# Patient Record
Sex: MALE | Race: BLACK OR AFRICAN AMERICAN | Employment: FULL TIME | URBAN - METROPOLITAN AREA
[De-identification: names, ages, dates, MRNs, and addresses within clinical notes are randomized per-mention and may not be internally consistent; named-entity substitution may affect disease eponyms.]

---

## 2017-04-21 NOTE — PROGRESS NOTES
HPI:    Patient ID: Curt Saldivar is a 46year old male. Pt presents to the clinic for hx of intermittent anxiety and depression. He started a new job last year at a [employer] that requires FMLA forms to be filled out.  His previous job of 14 years was Years:         Alcohol Use: Yes                     No current outpatient prescriptions on file. Allergies:No Known Allergies   PHYSICAL EXAM:   Physical Exam   Constitutional: He appears well-developed and well-nourished. No distress.    HENT:   Head: Nor cancer   FHx of colon CA; pt's grandmother  of colon CA. - Referred to [facility] for colonoscopy. No orders of the defined types were placed in this encounter.        Meds This Visit:  No prescriptions requested or ordered in this encounter

## 2017-04-28 ENCOUNTER — TELEPHONE (OUTPATIENT)
Dept: ADMINISTRATIVE | Age: 42
End: 2017-04-28

## 2017-04-28 NOTE — TELEPHONE ENCOUNTER
Dr. Aline Inman,  Pt requesting intermittent FMLA for anxiety. Limited HX.   Do you support this and if so how much time per month do you suggest?  Please advise, thanks Babs

## 2017-07-31 ENCOUNTER — TELEPHONE (OUTPATIENT)
Dept: INTERNAL MEDICINE CLINIC | Facility: CLINIC | Age: 42
End: 2017-07-31

## 2017-07-31 NOTE — TELEPHONE ENCOUNTER
Actions Requested: appt made   Problem: neck pain  Onset and Timing: over one month  Associated Symptoms: neck pain 7/10, intermittent, sharp, lightheaded  Aggravating by: bending forward, lifting  Alleviated by: nothing  Triage Note: advised patient to ma together (i.e., dermatomal distribution or \"band\" or \"stripe\")  * High-risk adult (e.g., history of cancer, HIV, or IV drug abuse)  * Patient wants to be seen  * Tenderness in front of neck over windpipe  * Moderate neck pain (e.g., interferes with nor

## 2017-08-01 ENCOUNTER — LAB ENCOUNTER (OUTPATIENT)
Dept: LAB | Age: 42
End: 2017-08-01
Attending: INTERNAL MEDICINE
Payer: COMMERCIAL

## 2017-08-01 ENCOUNTER — HOSPITAL ENCOUNTER (OUTPATIENT)
Dept: GENERAL RADIOLOGY | Age: 42
Discharge: HOME | End: 2017-08-01
Attending: INTERNAL MEDICINE
Payer: COMMERCIAL

## 2017-08-01 ENCOUNTER — OFFICE VISIT (OUTPATIENT)
Dept: INTERNAL MEDICINE CLINIC | Facility: CLINIC | Age: 42
End: 2017-08-01

## 2017-08-01 VITALS
BODY MASS INDEX: 21.14 KG/M2 | SYSTOLIC BLOOD PRESSURE: 110 MMHG | DIASTOLIC BLOOD PRESSURE: 74 MMHG | WEIGHT: 151 LBS | HEIGHT: 71 IN | HEART RATE: 60 BPM | TEMPERATURE: 98 F

## 2017-08-01 DIAGNOSIS — M54.2 CHRONIC NECK PAIN: ICD-10-CM

## 2017-08-01 DIAGNOSIS — R07.9 CHEST PAIN, UNSPECIFIED TYPE: ICD-10-CM

## 2017-08-01 DIAGNOSIS — G89.29 CHRONIC NECK PAIN: ICD-10-CM

## 2017-08-01 DIAGNOSIS — R55 SYNCOPE, UNSPECIFIED SYNCOPE TYPE: Primary | ICD-10-CM

## 2017-08-01 DIAGNOSIS — R55 SYNCOPE, UNSPECIFIED SYNCOPE TYPE: ICD-10-CM

## 2017-08-01 LAB
ALBUMIN SERPL BCP-MCNC: 3.9 G/DL (ref 3.5–4.8)
ALBUMIN/GLOB SERPL: 1.2 {RATIO} (ref 1–2)
ALP SERPL-CCNC: 39 U/L (ref 32–100)
ALT SERPL-CCNC: 13 U/L (ref 17–63)
ANION GAP SERPL CALC-SCNC: 6 MMOL/L (ref 0–18)
AST SERPL-CCNC: 19 U/L (ref 15–41)
BASOPHILS # BLD: 0 K/UL (ref 0–0.2)
BASOPHILS NFR BLD: 1 %
BILIRUB SERPL-MCNC: 0.6 MG/DL (ref 0.3–1.2)
BUN SERPL-MCNC: 12 MG/DL (ref 8–20)
BUN/CREAT SERPL: 8.9 (ref 10–20)
CALCIUM SERPL-MCNC: 8.9 MG/DL (ref 8.5–10.5)
CHLORIDE SERPL-SCNC: 104 MMOL/L (ref 95–110)
CO2 SERPL-SCNC: 28 MMOL/L (ref 22–32)
CREAT SERPL-MCNC: 1.35 MG/DL (ref 0.5–1.5)
EOSINOPHIL # BLD: 0.2 K/UL (ref 0–0.7)
EOSINOPHIL NFR BLD: 4 %
ERYTHROCYTE [DISTWIDTH] IN BLOOD BY AUTOMATED COUNT: 14 % (ref 11–15)
GLOBULIN PLAS-MCNC: 3.2 G/DL (ref 2.5–3.7)
GLUCOSE SERPL-MCNC: 100 MG/DL (ref 70–99)
HCT VFR BLD AUTO: 44.7 % (ref 41–52)
HGB BLD-MCNC: 14.8 G/DL (ref 13.5–17.5)
LYMPHOCYTES # BLD: 2.3 K/UL (ref 1–4)
LYMPHOCYTES NFR BLD: 42 %
MCH RBC QN AUTO: 30.6 PG (ref 27–32)
MCHC RBC AUTO-ENTMCNC: 33.1 G/DL (ref 32–37)
MCV RBC AUTO: 92.5 FL (ref 80–100)
MONOCYTES # BLD: 0.5 K/UL (ref 0–1)
MONOCYTES NFR BLD: 9 %
NEUTROPHILS # BLD AUTO: 2.5 K/UL (ref 1.8–7.7)
NEUTROPHILS NFR BLD: 45 %
OSMOLALITY UR CALC.SUM OF ELEC: 286 MOSM/KG (ref 275–295)
PLATELET # BLD AUTO: 154 K/UL (ref 140–400)
PMV BLD AUTO: 8.7 FL (ref 7.4–10.3)
POTASSIUM SERPL-SCNC: 4.1 MMOL/L (ref 3.3–5.1)
PROT SERPL-MCNC: 7.1 G/DL (ref 5.9–8.4)
RBC # BLD AUTO: 4.83 M/UL (ref 4.5–5.9)
SODIUM SERPL-SCNC: 138 MMOL/L (ref 136–144)
TSH SERPL-ACNC: 1.17 UIU/ML (ref 0.45–5.33)
WBC # BLD AUTO: 5.5 K/UL (ref 4–11)

## 2017-08-01 PROCEDURE — 99214 OFFICE O/P EST MOD 30 MIN: CPT | Performed by: INTERNAL MEDICINE

## 2017-08-01 PROCEDURE — 93000 ELECTROCARDIOGRAM COMPLETE: CPT | Performed by: INTERNAL MEDICINE

## 2017-08-01 PROCEDURE — 93005 ELECTROCARDIOGRAM TRACING: CPT | Performed by: INTERNAL MEDICINE

## 2017-08-01 PROCEDURE — 72040 X-RAY EXAM NECK SPINE 2-3 VW: CPT | Performed by: INTERNAL MEDICINE

## 2017-08-01 PROCEDURE — 80053 COMPREHEN METABOLIC PANEL: CPT

## 2017-08-01 PROCEDURE — 36415 COLL VENOUS BLD VENIPUNCTURE: CPT

## 2017-08-01 PROCEDURE — 84443 ASSAY THYROID STIM HORMONE: CPT

## 2017-08-01 PROCEDURE — 99212 OFFICE O/P EST SF 10 MIN: CPT | Performed by: INTERNAL MEDICINE

## 2017-08-01 PROCEDURE — 71020 XR CHEST PA + LAT CHEST (CPT=71020): CPT | Performed by: INTERNAL MEDICINE

## 2017-08-01 PROCEDURE — 85025 COMPLETE CBC W/AUTO DIFF WBC: CPT

## 2017-08-01 NOTE — PROGRESS NOTES
HPI:    Patient ID: Corby Desai is a 38year old male. Neck Pain    This is a new problem. The current episode started more than 1 month ago (2mos). The problem occurs intermittently. The problem has been waxing and waning.  The pain is associated wit Improvement on treatment: resolves spontaneously. Risk factors include male gender. Pertinent negatives for past medical history include no CAD, no diabetes and no hypertension.    Pertinent negatives for family medical history include: no CAD and no hear Neurological: He is alert and oriented to person, place, and time. He has normal strength. No sensory deficit. Reflex Scores:       Bicep reflexes are 2+ on the right side and 2+ on the left side.        Brachioradialis reflexes are 2+ on the right side [id number]

## 2017-08-03 ENCOUNTER — HOSPITAL ENCOUNTER (OUTPATIENT)
Dept: CV DIAGNOSTICS | Facility: HOSPITAL | Age: 42
Discharge: HOME | End: 2017-08-03
Attending: INTERNAL MEDICINE
Payer: COMMERCIAL

## 2017-08-03 DIAGNOSIS — R55 SYNCOPE, UNSPECIFIED SYNCOPE TYPE: ICD-10-CM

## 2017-08-03 PROCEDURE — 93227 XTRNL ECG REC<48 HR R&I: CPT | Performed by: INTERNAL MEDICINE

## 2017-08-05 ENCOUNTER — HOSPITAL ENCOUNTER (OUTPATIENT)
Dept: CV DIAGNOSTICS | Facility: HOSPITAL | Age: 42
Discharge: HOME | End: 2017-08-05
Attending: INTERNAL MEDICINE
Payer: COMMERCIAL

## 2017-08-05 PROCEDURE — 93225 XTRNL ECG REC<48 HRS REC: CPT | Performed by: INTERNAL MEDICINE

## 2017-08-07 ENCOUNTER — HOSPITAL ENCOUNTER (OUTPATIENT)
Dept: CV DIAGNOSTICS | Facility: HOSPITAL | Age: 42
Discharge: HOME | End: 2017-08-07
Attending: INTERNAL MEDICINE
Payer: COMMERCIAL

## 2017-08-07 DIAGNOSIS — R55 SYNCOPE, UNSPECIFIED SYNCOPE TYPE: ICD-10-CM

## 2017-08-07 DIAGNOSIS — R07.9 CHEST PAIN, UNSPECIFIED TYPE: ICD-10-CM

## 2017-08-07 PROCEDURE — 93306 TTE W/DOPPLER COMPLETE: CPT | Performed by: INTERNAL MEDICINE

## 2017-08-14 ENCOUNTER — HOSPITAL ENCOUNTER (OUTPATIENT)
Dept: CV DIAGNOSTICS | Facility: HOSPITAL | Age: 42
Discharge: HOME | End: 2017-08-14
Attending: INTERNAL MEDICINE
Payer: COMMERCIAL

## 2017-08-14 ENCOUNTER — TELEPHONE (OUTPATIENT)
Dept: INTERNAL MEDICINE CLINIC | Facility: CLINIC | Age: 42
End: 2017-08-14

## 2017-08-14 DIAGNOSIS — R07.9 CHEST PAIN, UNSPECIFIED TYPE: ICD-10-CM

## 2017-08-14 DIAGNOSIS — R55 SYNCOPE, UNSPECIFIED SYNCOPE TYPE: ICD-10-CM

## 2017-08-14 PROCEDURE — 93016 CV STRESS TEST SUPVJ ONLY: CPT | Performed by: INTERNAL MEDICINE

## 2017-08-14 PROCEDURE — 93018 CV STRESS TEST I&R ONLY: CPT | Performed by: INTERNAL MEDICINE

## 2017-08-14 PROCEDURE — 93017 CV STRESS TEST TRACING ONLY: CPT | Performed by: INTERNAL MEDICINE

## 2017-08-15 ENCOUNTER — OFFICE VISIT (OUTPATIENT)
Dept: INTERNAL MEDICINE CLINIC | Facility: CLINIC | Age: 42
End: 2017-08-15

## 2017-08-15 VITALS
BODY MASS INDEX: 21.76 KG/M2 | TEMPERATURE: 98 F | RESPIRATION RATE: 12 BRPM | WEIGHT: 152 LBS | DIASTOLIC BLOOD PRESSURE: 60 MMHG | SYSTOLIC BLOOD PRESSURE: 104 MMHG | HEART RATE: 68 BPM | HEIGHT: 70 IN

## 2017-08-15 DIAGNOSIS — M47.812 SPONDYLOSIS OF CERVICAL REGION WITHOUT MYELOPATHY OR RADICULOPATHY: ICD-10-CM

## 2017-08-15 DIAGNOSIS — R55 SYNCOPE, UNSPECIFIED SYNCOPE TYPE: ICD-10-CM

## 2017-08-15 DIAGNOSIS — R93.1 ABNORMAL ECHOCARDIOGRAM: ICD-10-CM

## 2017-08-15 DIAGNOSIS — M54.2 NECK PAIN: Primary | ICD-10-CM

## 2017-08-15 PROCEDURE — 99213 OFFICE O/P EST LOW 20 MIN: CPT | Performed by: INTERNAL MEDICINE

## 2017-08-15 PROCEDURE — 99212 OFFICE O/P EST SF 10 MIN: CPT | Performed by: INTERNAL MEDICINE

## 2017-08-16 NOTE — PROGRESS NOTES
HPI:    Patient ID: Steven Reyes is a 38year old male. Patient presents today for ffup and discussion of his tests results. We had seen  Him 2 weeks ago with complaints of neck pains and, chest pains/syncope. His labs showed normal cbc, tsh and cmp. Syncope, unspecified syncope type  Plan: pt had normal 48 hr holter and treadmill stress test. His 2d echo showed low normal LV systolic fxn and mild TR/MR.  I told pt I want him to see cardiologist for opinion regarding abnormal 2decho and also further kelsey

## 2017-08-17 NOTE — TELEPHONE ENCOUNTER
----- Message from Emilia Jerez MD sent at 8/2/2017  7:40 AM CDT -----  Notify pt; his cxr was fine; cervical spine xray showed mild degenerative arthritis.  We can refer pt for physical therapy for neck pain,  Labs cbc, cmp and  tsh were fine; proc

## 2017-08-28 ENCOUNTER — OFFICE VISIT (OUTPATIENT)
Dept: PHYSICAL THERAPY | Facility: HOSPITAL | Age: 42
End: 2017-08-28
Attending: PEDIATRICS
Payer: COMMERCIAL

## 2017-08-28 DIAGNOSIS — M54.2 NECK PAIN: ICD-10-CM

## 2017-08-28 DIAGNOSIS — M47.812 SPONDYLOSIS OF CERVICAL REGION WITHOUT MYELOPATHY OR RADICULOPATHY: ICD-10-CM

## 2017-08-28 PROCEDURE — 97162 PT EVAL MOD COMPLEX 30 MIN: CPT

## 2017-08-28 PROCEDURE — 97530 THERAPEUTIC ACTIVITIES: CPT

## 2017-08-28 NOTE — TELEPHONE ENCOUNTER
Pt called, message per Dr given. Verbalized understanding and compliance  Started PT today. saw Dr Nishi Berry last week in [address]

## 2017-08-28 NOTE — PROGRESS NOTES
CERVICAL SPINE EVALUATION:   Referring Physician: Flores Irizarry MD    Date of Onset: 7-8 months ago  Date of Service: 8/28/17   Diagnosis: Neck pain (M54.2)  Spondylosis of cervical region without myelopathy or radiculopathy (M47.812)   SUBJECTIV above impairments to allow for return to prior level of function.     Precautions: None       OBJECTIVE:   Observation/Posture: Poor posture - rounded shoulders, forward head    Cervical AROM:  Pain (+/-)   Flexion Min loss + (center neck)   Extension Sever to lift a 25# box overhead with pain <2/10    Frequency/Duration: Patient will be seen for 2x/week or a total of 10 visits over a 90 day period. Treatment will include: Manual Therapy; Therapeutic Exercises; Neuromuscular Re-education;  Therapeutic Activity

## 2017-08-30 ENCOUNTER — APPOINTMENT (OUTPATIENT)
Dept: PHYSICAL THERAPY | Facility: HOSPITAL | Age: 42
End: 2017-08-30
Attending: PEDIATRICS
Payer: COMMERCIAL

## 2017-08-30 ENCOUNTER — TELEPHONE (OUTPATIENT)
Dept: PHYSICAL THERAPY | Facility: HOSPITAL | Age: 42
End: 2017-08-30

## 2017-09-06 ENCOUNTER — TELEPHONE (OUTPATIENT)
Dept: ADMINISTRATIVE | Age: 42
End: 2017-09-06

## 2017-09-06 ENCOUNTER — OFFICE VISIT (OUTPATIENT)
Dept: PHYSICAL THERAPY | Facility: HOSPITAL | Age: 42
End: 2017-09-06
Attending: PEDIATRICS
Payer: COMMERCIAL

## 2017-09-06 PROCEDURE — 97110 THERAPEUTIC EXERCISES: CPT

## 2017-09-06 NOTE — PROGRESS NOTES
Diagnosis: Neck pain (M54.2), Spondylosis of cervical region without myelopathy or radiculopathy (B74.906)  Authorized # of Visits:  2    ([facility])     Next MD visit: none scheduled  Fall Risk: standard         Precautions: n/a           Medi

## 2017-09-06 NOTE — TELEPHONE ENCOUNTER
Good Afternoon Dr. Gal Echeverria form pending in [employer]. Pt reports that he has been off of work since 7/31/17 due to his neck pain and heart issues. Do you approve the continuous leave until his next cardio eval on 9/20/17? Please advise.     Than

## 2017-09-08 ENCOUNTER — OFFICE VISIT (OUTPATIENT)
Dept: PHYSICAL THERAPY | Facility: HOSPITAL | Age: 42
End: 2017-09-08
Attending: PEDIATRICS
Payer: COMMERCIAL

## 2017-09-08 PROCEDURE — 97110 THERAPEUTIC EXERCISES: CPT

## 2017-09-08 NOTE — PROGRESS NOTES
Diagnosis: Neck pain (M54.2), Spondylosis of cervical region without myelopathy or radiculopathy (J13.734)  Authorized # of Visits:  3    ([employer] OUT OF STATE PPO)     Next MD visit: none scheduled  Fall Risk: standard         Precautions: n/a           Medi

## 2017-09-12 ENCOUNTER — OFFICE VISIT (OUTPATIENT)
Dept: PHYSICAL THERAPY | Facility: HOSPITAL | Age: 42
End: 2017-09-12
Attending: PEDIATRICS
Payer: COMMERCIAL

## 2017-09-12 PROCEDURE — 97110 THERAPEUTIC EXERCISES: CPT

## 2017-09-12 NOTE — PROGRESS NOTES
Diagnosis: Neck pain (M54.2), Spondylosis of cervical region without myelopathy or radiculopathy (T00.227)  Authorized # of Visits:  4    ([employer] OUT OF STATE PPO)     Next MD visit: none scheduled  Fall Risk: standard         Precautions: n/a           Medi

## 2017-09-14 ENCOUNTER — OFFICE VISIT (OUTPATIENT)
Dept: PHYSICAL THERAPY | Facility: HOSPITAL | Age: 42
End: 2017-09-14
Attending: PEDIATRICS
Payer: COMMERCIAL

## 2017-09-14 PROCEDURE — 97110 THERAPEUTIC EXERCISES: CPT

## 2017-09-14 NOTE — PROGRESS NOTES
Diagnosis: Neck pain (M54.2), Spondylosis of cervical region without myelopathy or radiculopathy (V44.481)  Authorized # of Visits:  5    ([employer] OUT OF STATE PPO)     Next MD visit: none scheduled  Fall Risk: standard         Precautions: n/a           Medi a 25# box overhead with pain <2/10    Plan: Continue extension based cervical exercises, scap strengthening and posture education. Discussed patient POC with the PT. Charges:  Tx 3       Total Timed Treatment: 38 min  Total Treatment Time: 38 min

## 2017-09-21 ENCOUNTER — OFFICE VISIT (OUTPATIENT)
Dept: PHYSICAL THERAPY | Facility: HOSPITAL | Age: 42
End: 2017-09-21
Attending: PEDIATRICS
Payer: COMMERCIAL

## 2017-09-21 PROCEDURE — 97110 THERAPEUTIC EXERCISES: CPT

## 2017-09-21 NOTE — PROGRESS NOTES
DISCHARGE SUMMARY    Diagnosis: Neck pain (M54.2), Spondylosis of cervical region without myelopathy or radiculopathy (D12.430)  Authorized # of Visits:  6 ([address] [facility])     Next MD visit: none scheduled  Fall Risk: standard         Precautions: without disruption from pain - MET  4.  Patient will demo 5/5 B scap strength to be able to lift a 25# box overhead with pain <2/10 - NEARLY MET    FOTO score: 79/100 (21% limitation)    Plan: Discharge PT    Charges: TE x 2       Total Timed Treatment: 30

## 2017-10-09 ENCOUNTER — TELEPHONE (OUTPATIENT)
Dept: ADMINISTRATIVE | Age: 42
End: 2017-10-09

## 2017-10-09 NOTE — TELEPHONE ENCOUNTER
Dr. Manuelito Jeffrey form pending in [facility]. Pt was approved to be off til his cardio visit on 9/20/17 but pt rescheduled to 10/11/17. Do you approve his extended leave til 10/11/17? Please advise.     Thank you,  Belen Robin

## 2017-10-11 ENCOUNTER — TELEPHONE (OUTPATIENT)
Dept: CARDIOLOGY CLINIC | Facility: CLINIC | Age: 42
End: 2017-10-11

## 2017-10-11 ENCOUNTER — OFFICE VISIT (OUTPATIENT)
Dept: CARDIOLOGY CLINIC | Facility: CLINIC | Age: 42
End: 2017-10-11

## 2017-10-11 VITALS
DIASTOLIC BLOOD PRESSURE: 70 MMHG | HEART RATE: 72 BPM | HEIGHT: 71 IN | WEIGHT: 156 LBS | RESPIRATION RATE: 18 BRPM | SYSTOLIC BLOOD PRESSURE: 90 MMHG | BODY MASS INDEX: 21.84 KG/M2

## 2017-10-11 DIAGNOSIS — R07.9 CHEST PAIN, UNSPECIFIED TYPE: Primary | ICD-10-CM

## 2017-10-11 PROCEDURE — 99212 OFFICE O/P EST SF 10 MIN: CPT | Performed by: INTERNAL MEDICINE

## 2017-10-11 PROCEDURE — 99244 OFF/OP CNSLTJ NEW/EST MOD 40: CPT | Performed by: INTERNAL MEDICINE

## 2017-10-11 NOTE — PATIENT INSTRUCTIONS
Stress echocardiogram within the next few days.   Follow-up with me in 4 weeks if stress echo abnormal

## 2017-10-11 NOTE — PROGRESS NOTES
Darrel Landa is a 38year old male. HPI:    Debludwig Pierce is here for a new patient appointment. He denies any cardiac history but does complain of atypical chest pain. His pain is left-sided, sharp stabbing lasting for 10 seconds.   He denies any exertional s diagnosis)     The patient indicates understanding of these issues and agrees to the plan.   The patient is asked to return in as needed in 4 weeks if the stress test is abnormal.           Dorian De La Rosa MD  10/11/2017  11:35 AM

## 2017-10-19 ENCOUNTER — HOSPITAL ENCOUNTER (OUTPATIENT)
Dept: CV DIAGNOSTICS | Facility: HOSPITAL | Age: 42
Discharge: HOME | End: 2017-10-19
Attending: INTERNAL MEDICINE
Payer: COMMERCIAL

## 2017-10-19 DIAGNOSIS — R07.9 CHEST PAIN, UNSPECIFIED TYPE: ICD-10-CM

## 2017-10-19 PROCEDURE — 93018 CV STRESS TEST I&R ONLY: CPT | Performed by: INTERNAL MEDICINE

## 2017-10-19 PROCEDURE — 93017 CV STRESS TEST TRACING ONLY: CPT | Performed by: INTERNAL MEDICINE

## 2017-10-19 PROCEDURE — 93350 STRESS TTE ONLY: CPT | Performed by: INTERNAL MEDICINE

## 2017-10-19 PROCEDURE — 93016 CV STRESS TEST SUPVJ ONLY: CPT | Performed by: INTERNAL MEDICINE

## 2017-11-08 NOTE — TELEPHONE ENCOUNTER
New disability form received in [facility]. Signed release on file. Logged for processing.  NK

## 2017-11-13 ENCOUNTER — TELEPHONE (OUTPATIENT)
Dept: ADMINISTRATIVE | Age: 42
End: 2017-11-13

## 2017-11-13 NOTE — TELEPHONE ENCOUNTER
Dr. Obdulia Bradshaw. Form pending in [employer]. Pt. has been off work since 7/31/17 for neck and chest pains. Jose Bradley had him off for therapy and now until he could see cardio. His disability company is asking for more documentation and I need to know if h

## 2017-11-14 NOTE — TELEPHONE ENCOUNTER
Dr. Milton Coombs,    Disab form pending in [facility]. If the pt is cleared to return to work, can you please generate a return to work letter?     Thank you,  Star Carmen  (Covering for [facility])

## 2017-11-14 NOTE — TELEPHONE ENCOUNTER
Pt states that he needs letter to state that he can't return to work until 11/20/17 because he needs to find  etc for his kids and \"get things in order\" before he can go back. Please call.

## 2017-11-15 NOTE — TELEPHONE ENCOUNTER
Dr. Justin Jaimes,    Please sign off on form:  -Highlight the patient and hit \"Chart\" button. -In Chart Review, w/in the Encounter tab - open the Telephone call encounter for 11/13/2017.  Scroll down.  -Click \"scan on\" blue Hyperlink under \"Media\" heading

## 2017-11-15 NOTE — TELEPHONE ENCOUNTER
Dr. Last Cr,    I am covering for the [employer] forms rep that is usually at the [facility] office. If you need assistance on signing off on the form, you may reach me at [phone].      Thank you,  Marbin Hamilton

## 2017-11-17 ENCOUNTER — TELEPHONE (OUTPATIENT)
Dept: ADMINISTRATIVE | Age: 42
End: 2017-11-17

## 2017-11-17 ENCOUNTER — TELEPHONE (OUTPATIENT)
Dept: CARDIOLOGY CLINIC | Facility: CLINIC | Age: 42
End: 2017-11-17

## 2017-11-17 NOTE — TELEPHONE ENCOUNTER
Also see TE from 11/13/2017 - re: Letter  - pt requesting letter to be faxed to [phone] attn: Lore Nageotte. For add'l questions pls call pt. Thank you.

## 2017-11-17 NOTE — TELEPHONE ENCOUNTER
Dr. Michael Magallanes,    Please sign off on form:  -Highlight patient  -Hit Chart button  -Open Telephone encounter 11/17/2017  -Scroll to Kassy Magallanes and click to open image.  -Hit ACKNOWLEDGE button on bottom right corner and left-c

## 2017-11-17 NOTE — TELEPHONE ENCOUNTER
Dr. Angel Gregory,    Pt's cardio provider released pt to go back to work 11/20/17 but they haven't signed the disab form. Would you please be able to sign off on the disab form?     Thank you,  [facility]

## 2017-11-17 NOTE — TELEPHONE ENCOUNTER
S/w pt and clarified fax number. Pt states it is his girlfriends fax number. Explained to pt she is not part of his rajendra to his records. Pt may obtain via my chart or may .  Pt will stop by tomorrow to

## 2018-01-11 ENCOUNTER — CHARTING TRANS (OUTPATIENT)
Dept: OTHER | Age: 43
End: 2018-01-11

## 2018-01-23 ENCOUNTER — NURSE TRIAGE (OUTPATIENT)
Dept: OTHER | Age: 43
End: 2018-01-23

## 2018-01-23 NOTE — TELEPHONE ENCOUNTER
Action Requested: Summary for Provider     []  Critical Lab, Recommendations Needed  [] Need Additional Advice  []   FYI    []   Need Orders  [] Need Medications Sent to Pharmacy  []  Other     SUMMARY: Pt was scheduled for an appt on Friday.     Pt states

## 2018-01-31 PROBLEM — F41.1 GAD (GENERALIZED ANXIETY DISORDER): Status: ACTIVE | Noted: 2018-01-31

## 2018-01-31 NOTE — PROGRESS NOTES
HPI:    Patient ID: Erinn Hogan is a 38year old male. Anxiety   This is a recurrent problem. The current episode started more than 1 year ago (at least 10 yrs ). The problem occurs intermittently.  The problem has been gradually worsening (had been w are normal. He does not exhibit a depressed mood. ASSESSMENT/PLAN:   (F41.1) KARL (generalized anxiety disorder)  (primary encounter diagnosis)  Plan: pt scored 18 on KARL 7 questionnaire so does have mod to severe anxiety disorder.  I had recomm

## 2018-02-05 ENCOUNTER — TELEPHONE (OUTPATIENT)
Dept: ADMINISTRATIVE | Age: 43
End: 2018-02-05

## 2018-02-05 NOTE — TELEPHONE ENCOUNTER
Good afternoon Dr. Tonie Garibay,    [id number] [address] I[address]ra pending in [employer]. Pt is requesting intermittent time off of 1-3 days per month with episodes lasting 1-48 hours due to Anxiety starting on 2/1/18 for 12 months. Do you approve? Please advise.     Thank you,  [facility]

## 2018-02-05 NOTE — TELEPHONE ENCOUNTER
Dr. Heriberto Dobson,    Please sign off on form:  -Highlight the patient and hit \"Chart\" button. -In Chart Review, w/in the Encounter tab - click 1 time on the Telephone call encounter for 2/5/18.  Scroll down the telephone encounter.  -Click \"scan on\" blue

## 2018-02-06 NOTE — TELEPHONE ENCOUNTER
Faxed LA to [facility]. [facility]. Mailed copy to pt and notified pt. Billed.

## 2018-03-20 ENCOUNTER — TELEPHONE (OUTPATIENT)
Dept: ADMINISTRATIVE | Age: 43
End: 2018-03-20

## 2018-03-20 NOTE — TELEPHONE ENCOUNTER
Spoke with pt regarding his FMLA. Pt will check with his HR to see if he's eligible for FMLA and then send a new form.

## 2018-03-20 NOTE — TELEPHONE ENCOUNTER
Late entry: Per pc w/ pt yesterday 03/19, revised form form re faxed to HR, scanned, mailed to pt.  NK

## 2018-09-13 ENCOUNTER — TELEPHONE (OUTPATIENT)
Dept: ADMINISTRATIVE | Age: 43
End: 2018-09-13

## 2018-09-13 NOTE — TELEPHONE ENCOUNTER
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 Logged for processing.  NK

## 2018-09-14 NOTE — PROGRESS NOTES
HPI:    Patient ID: Gerald Sandhu is a 41year old male. Anxiety   This is a chronic problem. The current episode started more than 1 year ago. The problem occurs intermittently. The problem has been waxing and waning.  The symptoms are aggravated by st prn only. He was told he cant operate any machinery if he takes xanaxl. No orders of the defined types were placed in this encounter.       Meds This Visit:  Requested Prescriptions      No prescriptions requested or ordered in this encounter       Im

## 2018-09-17 NOTE — TELEPHONE ENCOUNTER
Dr. Suraj Prescott,    [facility] intermittent leave: pt requesting 1-3 days per month with episodes lasting 1-2 days for anxiety for 6 months. Please sign off on form if you approve:  -Highlight the patient and hit \"Chart\" button. -In Chart Review, w/in the Encounter tab - click 1 time on the Telephone call encounter for 9/13/18. Scroll down the telephone encounter.  -Click \"scan on\" blue Hyperlink under \"Media\" heading for FMLA Dr. Suraj Prescott 9/17/18 w/in the telephone enc.  -Click on Acknowledge button at the bottom right corner and left-click onto image, signature stamp appears and drag signature to Provider signature line. Stamp will turn blue. Close window.     Thank you,  [facility]

## 2018-09-17 NOTE — TELEPHONE ENCOUNTER
Dr. Lexy Burton,    1 more form needs a signoff - Health status form.     Thank you,  [facility]

## 2018-09-28 ENCOUNTER — TELEPHONE (OUTPATIENT)
Dept: ADMINISTRATIVE | Age: 43
End: 2018-09-28

## 2018-09-28 NOTE — TELEPHONE ENCOUNTER
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 Update? Logged for processing.  NK

## 2018-11-02 VITALS
OXYGEN SATURATION: 98 % | DIASTOLIC BLOOD PRESSURE: 74 MMHG | HEART RATE: 72 BPM | WEIGHT: 155 LBS | SYSTOLIC BLOOD PRESSURE: 122 MMHG | RESPIRATION RATE: 18 BRPM | TEMPERATURE: 98.4 F

## 2019-01-14 ENCOUNTER — APPOINTMENT (OUTPATIENT)
Dept: GENERAL RADIOLOGY | Facility: HOSPITAL | Age: 44
End: 2019-01-14
Payer: COMMERCIAL

## 2019-01-14 ENCOUNTER — NURSE TRIAGE (OUTPATIENT)
Dept: OTHER | Age: 44
End: 2019-01-14

## 2019-01-14 ENCOUNTER — HOSPITAL ENCOUNTER (EMERGENCY)
Facility: HOSPITAL | Age: 44
Discharge: HOME | End: 2019-01-14
Attending: EMERGENCY MEDICINE
Payer: COMMERCIAL

## 2019-01-14 VITALS
RESPIRATION RATE: 18 BRPM | DIASTOLIC BLOOD PRESSURE: 71 MMHG | OXYGEN SATURATION: 98 % | TEMPERATURE: 98 F | HEART RATE: 69 BPM | WEIGHT: 155 LBS | BODY MASS INDEX: 21.7 KG/M2 | SYSTOLIC BLOOD PRESSURE: 103 MMHG | HEIGHT: 71 IN

## 2019-01-14 DIAGNOSIS — R07.9 CHEST PAIN, UNSPECIFIED TYPE: ICD-10-CM

## 2019-01-14 DIAGNOSIS — M94.0 COSTOCHONDRITIS: Primary | ICD-10-CM

## 2019-01-14 LAB
ANION GAP SERPL CALC-SCNC: 14 MMOL/L (ref 0–18)
BASOPHILS # BLD: 0 K/UL (ref 0–0.2)
BASOPHILS NFR BLD: 1 %
BUN SERPL-MCNC: 8 MG/DL (ref 8–20)
BUN/CREAT SERPL: 6.2 (ref 10–20)
CALCIUM SERPL-MCNC: 9.2 MG/DL (ref 8.5–10.5)
CHLORIDE SERPL-SCNC: 101 MMOL/L (ref 95–110)
CO2 SERPL-SCNC: 24 MMOL/L (ref 22–32)
CREAT SERPL-MCNC: 1.3 MG/DL (ref 0.5–1.5)
D DIMER PPP FEU-MCNC: <0.27 MCG/ML (ref ?–0.5)
EOSINOPHIL # BLD: 0.1 K/UL (ref 0–0.7)
EOSINOPHIL NFR BLD: 3 %
ERYTHROCYTE [DISTWIDTH] IN BLOOD BY AUTOMATED COUNT: 13.8 % (ref 11–15)
GLUCOSE SERPL-MCNC: 93 MG/DL (ref 70–99)
HCT VFR BLD AUTO: 45.6 % (ref 41–52)
HGB BLD-MCNC: 15.5 G/DL (ref 13.5–17.5)
LYMPHOCYTES # BLD: 1.7 K/UL (ref 1–4)
LYMPHOCYTES NFR BLD: 44 %
MCH RBC QN AUTO: 31 PG (ref 27–32)
MCHC RBC AUTO-ENTMCNC: 33.9 G/DL (ref 32–37)
MCV RBC AUTO: 91.4 FL (ref 80–100)
MONOCYTES # BLD: 0.4 K/UL (ref 0–1)
MONOCYTES NFR BLD: 9 %
NEUTROPHILS # BLD AUTO: 1.7 K/UL (ref 1.8–7.7)
NEUTROPHILS NFR BLD: 43 %
OSMOLALITY UR CALC.SUM OF ELEC: 286 MOSM/KG (ref 275–295)
PLATELET # BLD AUTO: 190 K/UL (ref 140–400)
PMV BLD AUTO: 7.6 FL (ref 7.4–10.3)
POTASSIUM SERPL-SCNC: 4.1 MMOL/L (ref 3.3–5.1)
RBC # BLD AUTO: 4.99 M/UL (ref 4.5–5.9)
SODIUM SERPL-SCNC: 139 MMOL/L (ref 136–144)
TROPONIN I SERPL-MCNC: 0 NG/ML (ref ?–0.03)
WBC # BLD AUTO: 3.9 K/UL (ref 4–11)

## 2019-01-14 PROCEDURE — 80048 BASIC METABOLIC PNL TOTAL CA: CPT

## 2019-01-14 PROCEDURE — 99285 EMERGENCY DEPT VISIT HI MDM: CPT

## 2019-01-14 PROCEDURE — 85025 COMPLETE CBC W/AUTO DIFF WBC: CPT

## 2019-01-14 PROCEDURE — 71046 X-RAY EXAM CHEST 2 VIEWS: CPT | Performed by: EMERGENCY MEDICINE

## 2019-01-14 PROCEDURE — 36415 COLL VENOUS BLD VENIPUNCTURE: CPT

## 2019-01-14 PROCEDURE — 85025 COMPLETE CBC W/AUTO DIFF WBC: CPT | Performed by: EMERGENCY MEDICINE

## 2019-01-14 PROCEDURE — 93005 ELECTROCARDIOGRAM TRACING: CPT

## 2019-01-14 PROCEDURE — 84484 ASSAY OF TROPONIN QUANT: CPT

## 2019-01-14 PROCEDURE — 80048 BASIC METABOLIC PNL TOTAL CA: CPT | Performed by: EMERGENCY MEDICINE

## 2019-01-14 PROCEDURE — 84484 ASSAY OF TROPONIN QUANT: CPT | Performed by: EMERGENCY MEDICINE

## 2019-01-14 PROCEDURE — 85379 FIBRIN DEGRADATION QUANT: CPT | Performed by: EMERGENCY MEDICINE

## 2019-01-14 PROCEDURE — 93010 ELECTROCARDIOGRAM REPORT: CPT | Performed by: EMERGENCY MEDICINE

## 2019-01-14 RX ORDER — RANITIDINE 150 MG/1
150 TABLET ORAL EVERY 12 HOURS
Qty: 20 TABLET | Refills: 0 | Status: SHIPPED | OUTPATIENT
Start: 2019-01-14 | End: 2019-01-24

## 2019-01-14 RX ORDER — NAPROXEN 500 MG/1
500 TABLET ORAL 2 TIMES DAILY PRN
Qty: 20 TABLET | Refills: 0 | Status: SHIPPED | OUTPATIENT
Start: 2019-01-14 | End: 2019-01-21

## 2019-01-14 NOTE — ED PROVIDER NOTES
Patient Seen in: [facility] AND [facility] Emergency Department    History   Patient presents with:  Chest Pain Angina (cardiovascular)    Stated Complaint:     HPI    41year old male presenting with chest pain. Pain began 2 weeks ago .  The patient describes th (36.4 °C)   Temp src Oral   SpO2 98 %   O2 Device None (Room air)       Current:/71   Pulse 69   Temp 97.5 °F (36.4 °C) (Oral)   Resp 18   Ht 180.3 cm (5' 11\")   Wt 70.3 kg   SpO2 98%   BMI 21.62 kg/m²   PULSE OX Nl on room air          Physical Exa ---------                               -----------         ------                     CBC W/ DIFFERENTIAL[[id number]]          Abnormal            Final result                 Please view results for these tests on the individual orders. dissection or recent aortic manipulation) no  High Risk Pain Features (Severe.  Abrupt Ripping or tearing) no  High Risk Exam Features (pulse deficit, BP difference, focal neuro deficit,murmur of aortic insufficiency, hypotension or shock) no         MDM

## 2019-03-01 NOTE — TELEPHONE ENCOUNTER
Pt needs LA revised to have start date of 2/14/18. Will revise and send. Billed for $15. Pt aware. 01-Mar-2019 21:48

## 2019-04-09 ENCOUNTER — TELEPHONE (OUTPATIENT)
Dept: INTERNAL MEDICINE CLINIC | Facility: CLINIC | Age: 44
End: 2019-04-09

## 2019-04-09 NOTE — PROGRESS NOTES
HPI:    Patient ID: Franklin Winkler is a 41year old male. Anxiety   This is a chronic problem. The current episode started more than 1 year ago. The problem occurs intermittently. The problem has been waxing and waning.  The symptoms are aggravated by st normal mood and affect. ASSESSMENT/PLAN:   (F41.1) KARL (generalized anxiety disorder)  (primary encounter diagnosis)  Plan: continue with current meds.  Pt's fmla forms will be filled out.     (Z13.926) Screening cholesterol level  Plan: LIPID

## 2019-04-10 PROBLEM — Z13.220 SCREENING CHOLESTEROL LEVEL: Status: ACTIVE | Noted: 2019-04-10

## 2019-04-10 PROBLEM — R07.9 CHEST PAIN: Status: RESOLVED | Noted: 2017-08-01 | Resolved: 2019-04-10

## 2019-04-10 PROBLEM — Z80.0 FAMILY HISTORY OF COLON CANCER: Status: ACTIVE | Noted: 2019-04-10

## 2019-04-10 NOTE — TELEPHONE ENCOUNTER
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 Logged for processing.  NK

## 2019-04-15 NOTE — TELEPHONE ENCOUNTER
Dr. Jamaica Atwood,    Patient is requesting a change in his yearly FMLA from 1-3 days off per month for anxiety to 1-4 days per month. He also would like to add that he may start late on occasion due to medication side effects.  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

## 2019-04-16 NOTE — TELEPHONE ENCOUNTER
Dr. Anabel Ocampo,    Please sign off on form:  -Highlight the patient and hit \"Chart\" button. -In Chart Review, w/in the Encounter tab - click 1 time on the Telephone call encounter for 4/9/19.  Scroll down the telephone encounter.  -Click \"scan on\" blue

## 2019-05-30 ENCOUNTER — APPOINTMENT (OUTPATIENT)
Dept: INTERNAL MEDICINE | Age: 44
End: 2019-05-30

## 2020-03-10 NOTE — TELEPHONE ENCOUNTER
Action Requested: Summary for Provider     []  Critical Lab, Recommendations Needed  [] Need Additional Advice  []   FYI    []   Need Orders  [] Need Medications Sent to Pharmacy  []  Other     SUMMARY: Patient requesting MD advice regarding ED recommendat
Agree with ER kenyon especially that his chest pains are related to exertion/activity so need to make sure if cardiac or not.
Clinical Impression:  Costochondritis  (primary encounter diagnosis)  Chest pain, unspecified type     Disposition:  Discharge  1/14/2019  3:43 pm     Follow-up:  Pao Weaver MD  [address] [facility]\"" [address] [facility]
Patient went to [facility] ER yesterday.     Disposition and Plan    Clinical Impression:  Costochondritis  (primary encounter diagnosis)  Chest pain, unspecified type     Disposition:  Discharge     Follow-up:  Salazar Cordero MD  [address] [facility] [address]
Pt stated he will be going to [address] [place] [address] today per EL's advice.
pictorial

## 2020-03-18 ENCOUNTER — TELEPHONE (OUTPATIENT)
Dept: INTERNAL MEDICINE CLINIC | Facility: CLINIC | Age: 45
End: 2020-03-18

## 2020-03-18 NOTE — TELEPHONE ENCOUNTER
Your patient called with coughing, \"hard to breath\", runny nose, for past 8 days and have taken OTC cold/flu medicine. Please contact patient to determine if they should be treated over the phone by you or requires further testing.   Please refer to t

## 2020-03-18 NOTE — TELEPHONE ENCOUNTER
Talked to pt; complained of having cold symptoms a week ago but started to have persistent dry cough but also having SOB. No fevers noted and no travel hx nor contact with ill persons. Pt also said phlegm blood tinged.  He said even walking inside his home

## 2020-05-18 RX ORDER — METOCLOPRAMIDE 10 MG/1
10 TABLET ORAL ONCE
Status: DISCONTINUED | OUTPATIENT
Start: 2020-05-18 | End: 2020-05-18

## 2020-05-18 RX ORDER — ASCORBIC ACID 250 MG
1 TABLET,CHEWABLE ORAL DAILY
COMMUNITY
End: 2021-11-10 | Stop reason: ALTCHOICE

## 2020-05-18 RX ORDER — CHLORAL HYDRATE 500 MG
1000 CAPSULE ORAL 2 TIMES DAILY
COMMUNITY
End: 2021-11-10 | Stop reason: ALTCHOICE

## 2020-05-18 RX ORDER — ACETAMINOPHEN 500 MG
500 TABLET ORAL EVERY 6 HOURS PRN
Status: ON HOLD | COMMUNITY
End: 2020-05-20

## 2020-05-19 ENCOUNTER — LAB ENCOUNTER (OUTPATIENT)
Dept: LAB | Facility: HOSPITAL | Age: 45
End: 2020-05-19
Attending: ORTHOPAEDIC SURGERY
Payer: COMMERCIAL

## 2020-05-19 ENCOUNTER — ANESTHESIA EVENT (OUTPATIENT)
Dept: SURGERY | Facility: HOSPITAL | Age: 45
End: 2020-05-19
Payer: OTHER MISCELLANEOUS

## 2020-05-19 DIAGNOSIS — Z01.818 PREOPERATIVE TESTING: ICD-10-CM

## 2020-05-19 NOTE — H&P
Radha Awan 46 NAME: Ashu Torrez   ATTENDING PHYSICIAN: Dani Faust MD   PATIENT ACCOUNT#:   [de-identified]    LOCATION:    MEDICAL RECORD #:   [id number]       YOB: 1975  ADMISSION DATE:       05/20/2020    HISTORY AND PHY

## 2020-05-20 ENCOUNTER — HOSPITAL ENCOUNTER (OUTPATIENT)
Facility: HOSPITAL | Age: 45
Setting detail: HOSPITAL OUTPATIENT SURGERY
Discharge: HOME | End: 2020-05-20
Attending: ORTHOPAEDIC SURGERY | Admitting: ORTHOPAEDIC SURGERY
Payer: OTHER MISCELLANEOUS

## 2020-05-20 ENCOUNTER — ANESTHESIA (OUTPATIENT)
Dept: SURGERY | Facility: HOSPITAL | Age: 45
End: 2020-05-20
Payer: OTHER MISCELLANEOUS

## 2020-05-20 VITALS
OXYGEN SATURATION: 93 % | WEIGHT: 143 LBS | HEIGHT: 70 IN | DIASTOLIC BLOOD PRESSURE: 76 MMHG | TEMPERATURE: 98 F | HEART RATE: 83 BPM | RESPIRATION RATE: 16 BRPM | BODY MASS INDEX: 20.47 KG/M2 | SYSTOLIC BLOOD PRESSURE: 119 MMHG

## 2020-05-20 DIAGNOSIS — Z01.818 PREOPERATIVE TESTING: Primary | ICD-10-CM

## 2020-05-20 DIAGNOSIS — M75.112 INCOMPLETE TEAR OF LEFT ROTATOR CUFF, UNSPECIFIED WHETHER TRAUMATIC: ICD-10-CM

## 2020-05-20 PROCEDURE — 3E0T3BZ INTRODUCTION OF ANESTHETIC AGENT INTO PERIPHERAL NERVES AND PLEXI, PERCUTANEOUS APPROACH: ICD-10-PCS | Performed by: ANESTHESIOLOGY

## 2020-05-20 PROCEDURE — 0LQ24ZZ REPAIR LEFT SHOULDER TENDON, PERCUTANEOUS ENDOSCOPIC APPROACH: ICD-10-PCS | Performed by: ORTHOPAEDIC SURGERY

## 2020-05-20 PROCEDURE — 0RNK4ZZ RELEASE LEFT SHOULDER JOINT, PERCUTANEOUS ENDOSCOPIC APPROACH: ICD-10-PCS | Performed by: ORTHOPAEDIC SURGERY

## 2020-05-20 RX ORDER — MORPHINE SULFATE 10 MG/ML
6 INJECTION, SOLUTION INTRAMUSCULAR; INTRAVENOUS EVERY 10 MIN PRN
Status: DISCONTINUED | OUTPATIENT
Start: 2020-05-20 | End: 2020-05-20

## 2020-05-20 RX ORDER — SODIUM CHLORIDE, SODIUM LACTATE, POTASSIUM CHLORIDE, CALCIUM CHLORIDE 600; 310; 30; 20 MG/100ML; MG/100ML; MG/100ML; MG/100ML
INJECTION, SOLUTION INTRAVENOUS CONTINUOUS
Status: DISCONTINUED | OUTPATIENT
Start: 2020-05-20 | End: 2020-05-20

## 2020-05-20 RX ORDER — ACETAMINOPHEN 325 MG/1
650 TABLET ORAL EVERY 4 HOURS PRN
Status: DISCONTINUED | OUTPATIENT
Start: 2020-05-20 | End: 2020-05-20

## 2020-05-20 RX ORDER — HYDROCODONE BITARTRATE AND ACETAMINOPHEN 5; 325 MG/1; MG/1
1 TABLET ORAL EVERY 4 HOURS PRN
Status: DISCONTINUED | OUTPATIENT
Start: 2020-05-20 | End: 2020-05-20

## 2020-05-20 RX ORDER — ROPIVACAINE HYDROCHLORIDE 5 MG/ML
INJECTION, SOLUTION EPIDURAL; INFILTRATION; PERINEURAL
Status: COMPLETED | OUTPATIENT
Start: 2020-05-20 | End: 2020-05-20

## 2020-05-20 RX ORDER — HYDROMORPHONE HYDROCHLORIDE 1 MG/ML
0.4 INJECTION, SOLUTION INTRAMUSCULAR; INTRAVENOUS; SUBCUTANEOUS EVERY 5 MIN PRN
Status: DISCONTINUED | OUTPATIENT
Start: 2020-05-20 | End: 2020-05-20

## 2020-05-20 RX ORDER — HYDROMORPHONE HYDROCHLORIDE 1 MG/ML
0.2 INJECTION, SOLUTION INTRAMUSCULAR; INTRAVENOUS; SUBCUTANEOUS EVERY 5 MIN PRN
Status: DISCONTINUED | OUTPATIENT
Start: 2020-05-20 | End: 2020-05-20

## 2020-05-20 RX ORDER — ROCURONIUM BROMIDE 10 MG/ML
INJECTION, SOLUTION INTRAVENOUS AS NEEDED
Status: DISCONTINUED | OUTPATIENT
Start: 2020-05-20 | End: 2020-05-20 | Stop reason: SURG

## 2020-05-20 RX ORDER — CEFAZOLIN SODIUM/WATER 2 G/20 ML
2 SYRINGE (ML) INTRAVENOUS ONCE
Status: COMPLETED | OUTPATIENT
Start: 2020-05-20 | End: 2020-05-20

## 2020-05-20 RX ORDER — ONDANSETRON 2 MG/ML
4 INJECTION INTRAMUSCULAR; INTRAVENOUS EVERY 6 HOURS PRN
Status: DISCONTINUED | OUTPATIENT
Start: 2020-05-20 | End: 2020-05-20

## 2020-05-20 RX ORDER — NALOXONE HYDROCHLORIDE 0.4 MG/ML
80 INJECTION, SOLUTION INTRAMUSCULAR; INTRAVENOUS; SUBCUTANEOUS AS NEEDED
Status: DISCONTINUED | OUTPATIENT
Start: 2020-05-20 | End: 2020-05-20

## 2020-05-20 RX ORDER — ONDANSETRON 4 MG/1
4 TABLET, FILM COATED ORAL EVERY 8 HOURS PRN
Qty: 10 TABLET | Refills: 1 | Status: SHIPPED | COMMUNITY
End: 2021-11-10 | Stop reason: ALTCHOICE

## 2020-05-20 RX ORDER — HYDROCODONE BITARTRATE AND ACETAMINOPHEN 5; 325 MG/1; MG/1
2 TABLET ORAL EVERY 4 HOURS PRN
Status: DISCONTINUED | OUTPATIENT
Start: 2020-05-20 | End: 2020-05-20

## 2020-05-20 RX ORDER — HYDROMORPHONE HYDROCHLORIDE 1 MG/ML
0.6 INJECTION, SOLUTION INTRAMUSCULAR; INTRAVENOUS; SUBCUTANEOUS EVERY 5 MIN PRN
Status: DISCONTINUED | OUTPATIENT
Start: 2020-05-20 | End: 2020-05-20

## 2020-05-20 RX ORDER — MIDAZOLAM HYDROCHLORIDE 1 MG/ML
INJECTION INTRAMUSCULAR; INTRAVENOUS
Status: COMPLETED | OUTPATIENT
Start: 2020-05-20 | End: 2020-05-20

## 2020-05-20 RX ORDER — FLUMAZENIL 0.1 MG/ML
INJECTION, SOLUTION INTRAVENOUS AS NEEDED
Status: DISCONTINUED | OUTPATIENT
Start: 2020-05-20 | End: 2020-05-20 | Stop reason: SURG

## 2020-05-20 RX ORDER — HYDROCODONE BITARTRATE AND ACETAMINOPHEN 10; 325 MG/1; MG/1
TABLET ORAL
Qty: 40 TABLET | Refills: 0 | Status: SHIPPED | COMMUNITY
End: 2021-10-22

## 2020-05-20 RX ORDER — CEPHALEXIN 500 MG/1
500 CAPSULE ORAL 4 TIMES DAILY
Qty: 12 CAPSULE | Refills: 0 | Status: SHIPPED | COMMUNITY
End: 2021-11-10 | Stop reason: ALTCHOICE

## 2020-05-20 RX ORDER — PHENYLEPHRINE HCL 10 MG/ML
VIAL (ML) INJECTION AS NEEDED
Status: DISCONTINUED | OUTPATIENT
Start: 2020-05-20 | End: 2020-05-20 | Stop reason: SURG

## 2020-05-20 RX ORDER — FAMOTIDINE 20 MG/1
20 TABLET ORAL ONCE
Status: DISCONTINUED | OUTPATIENT
Start: 2020-05-20 | End: 2020-05-20 | Stop reason: HOSPADM

## 2020-05-20 RX ORDER — LIDOCAINE HYDROCHLORIDE 10 MG/ML
INJECTION, SOLUTION EPIDURAL; INFILTRATION; INTRACAUDAL; PERINEURAL AS NEEDED
Status: DISCONTINUED | OUTPATIENT
Start: 2020-05-20 | End: 2020-05-20 | Stop reason: SURG

## 2020-05-20 RX ORDER — ALBUTEROL SULFATE 90 UG/1
AEROSOL, METERED RESPIRATORY (INHALATION) AS NEEDED
Status: DISCONTINUED | OUTPATIENT
Start: 2020-05-20 | End: 2020-05-20 | Stop reason: SURG

## 2020-05-20 RX ORDER — DEXAMETHASONE SODIUM PHOSPHATE 4 MG/ML
VIAL (ML) INJECTION AS NEEDED
Status: DISCONTINUED | OUTPATIENT
Start: 2020-05-20 | End: 2020-05-20 | Stop reason: SURG

## 2020-05-20 RX ORDER — PROCHLORPERAZINE EDISYLATE 5 MG/ML
5 INJECTION INTRAMUSCULAR; INTRAVENOUS ONCE AS NEEDED
Status: DISCONTINUED | OUTPATIENT
Start: 2020-05-20 | End: 2020-05-20

## 2020-05-20 RX ORDER — MORPHINE SULFATE 4 MG/ML
4 INJECTION, SOLUTION INTRAMUSCULAR; INTRAVENOUS EVERY 10 MIN PRN
Status: DISCONTINUED | OUTPATIENT
Start: 2020-05-20 | End: 2020-05-20

## 2020-05-20 RX ORDER — ONDANSETRON 2 MG/ML
4 INJECTION INTRAMUSCULAR; INTRAVENOUS ONCE AS NEEDED
Status: DISCONTINUED | OUTPATIENT
Start: 2020-05-20 | End: 2020-05-20

## 2020-05-20 RX ORDER — MORPHINE SULFATE 4 MG/ML
2 INJECTION, SOLUTION INTRAMUSCULAR; INTRAVENOUS EVERY 10 MIN PRN
Status: DISCONTINUED | OUTPATIENT
Start: 2020-05-20 | End: 2020-05-20

## 2020-05-20 RX ORDER — ONDANSETRON 2 MG/ML
INJECTION INTRAMUSCULAR; INTRAVENOUS AS NEEDED
Status: DISCONTINUED | OUTPATIENT
Start: 2020-05-20 | End: 2020-05-20 | Stop reason: SURG

## 2020-05-20 RX ORDER — HYDROCODONE BITARTRATE AND ACETAMINOPHEN 5; 325 MG/1; MG/1
1 TABLET ORAL AS NEEDED
Status: DISCONTINUED | OUTPATIENT
Start: 2020-05-20 | End: 2020-05-20

## 2020-05-20 RX ORDER — ACETAMINOPHEN 500 MG
1000 TABLET ORAL ONCE
Status: COMPLETED | OUTPATIENT
Start: 2020-05-20 | End: 2020-05-20

## 2020-05-20 RX ORDER — HALOPERIDOL 5 MG/ML
0.25 INJECTION INTRAMUSCULAR ONCE AS NEEDED
Status: DISCONTINUED | OUTPATIENT
Start: 2020-05-20 | End: 2020-05-20

## 2020-05-20 RX ORDER — HYDROCODONE BITARTRATE AND ACETAMINOPHEN 5; 325 MG/1; MG/1
2 TABLET ORAL AS NEEDED
Status: DISCONTINUED | OUTPATIENT
Start: 2020-05-20 | End: 2020-05-20

## 2020-05-20 RX ORDER — OXYCODONE HCL 10 MG/1
10 TABLET, FILM COATED, EXTENDED RELEASE ORAL EVERY 12 HOURS
Qty: 10 TABLET | Refills: 0 | Status: SHIPPED | COMMUNITY
End: 2021-10-22

## 2020-05-20 RX ADMIN — LIDOCAINE HYDROCHLORIDE 50 MG: 10 INJECTION, SOLUTION EPIDURAL; INFILTRATION; INTRACAUDAL; PERINEURAL at 07:12:00

## 2020-05-20 RX ADMIN — ROPIVACAINE HYDROCHLORIDE 30 ML: 5 INJECTION, SOLUTION EPIDURAL; INFILTRATION; PERINEURAL at 06:54:00

## 2020-05-20 RX ADMIN — SODIUM CHLORIDE, SODIUM LACTATE, POTASSIUM CHLORIDE, CALCIUM CHLORIDE: 600; 310; 30; 20 INJECTION, SOLUTION INTRAVENOUS at 07:37:00

## 2020-05-20 RX ADMIN — FLUMAZENIL 0.2 MG: 0.1 INJECTION, SOLUTION INTRAVENOUS at 08:12:00

## 2020-05-20 RX ADMIN — SODIUM CHLORIDE, SODIUM LACTATE, POTASSIUM CHLORIDE, CALCIUM CHLORIDE: 600; 310; 30; 20 INJECTION, SOLUTION INTRAVENOUS at 07:22:00

## 2020-05-20 RX ADMIN — MIDAZOLAM HYDROCHLORIDE 2 MG: 1 INJECTION INTRAMUSCULAR; INTRAVENOUS at 06:54:00

## 2020-05-20 RX ADMIN — ALBUTEROL SULFATE 2 PUFF: 90 AEROSOL, METERED RESPIRATORY (INHALATION) at 08:11:00

## 2020-05-20 RX ADMIN — CEFAZOLIN SODIUM/WATER 2 G: 2 G/20 ML SYRINGE (ML) INTRAVENOUS at 07:10:00

## 2020-05-20 RX ADMIN — ROCURONIUM BROMIDE 5 MG: 10 INJECTION, SOLUTION INTRAVENOUS at 07:12:00

## 2020-05-20 RX ADMIN — SODIUM CHLORIDE, SODIUM LACTATE, POTASSIUM CHLORIDE, CALCIUM CHLORIDE: 600; 310; 30; 20 INJECTION, SOLUTION INTRAVENOUS at 07:38:00

## 2020-05-20 RX ADMIN — ONDANSETRON 4 MG: 2 INJECTION INTRAMUSCULAR; INTRAVENOUS at 07:37:00

## 2020-05-20 RX ADMIN — ALBUTEROL SULFATE 2 PUFF: 90 AEROSOL, METERED RESPIRATORY (INHALATION) at 07:57:00

## 2020-05-20 RX ADMIN — DEXAMETHASONE SODIUM PHOSPHATE 4 MG: 4 MG/ML VIAL (ML) INJECTION at 07:24:00

## 2020-05-20 RX ADMIN — PHENYLEPHRINE HCL 100 MCG: 10 MG/ML VIAL (ML) INJECTION at 07:21:00

## 2020-05-20 NOTE — ANESTHESIA PROCEDURE NOTES
Peripheral Block  Date/Time: 5/20/2020 6:54 AM  Performed by: Avani Reese MD  Authorized by: Avani Reese MD       General Information and Staff    Start Time:  5/20/2020 6:52 AM  End Time:  5/20/2020 6:54 AM  Anesthesiologist:  Avani Reese MD  Per

## 2020-05-20 NOTE — ANESTHESIA PREPROCEDURE EVALUATION
Anesthesia PreOp Note    HPI:     Corby Desai is a 45year old male who presents for preoperative consultation requested by: Dali Griffith MD    Date of Surgery: 5/20/2020    Procedure(s):  SHOULDER ARTHROSCOPY ROTATOR CUFF REPAIR  Indication: partial r Esme Powell PA    No current [facility]-ordered outpatient medications on file.       No Known Allergies    Family History   Problem Relation Age of Onset   • Diabetes Father    • Diabetes Mother    • Cancer Maternal Grandmother         colon     Social Hist and weight is 64.9 kg (143 lb). His oral temperature is 97.8 °F (36.6 °C). His blood pressure is 119/81 and his pulse is 80. His respiration is 15 and oxygen saturation is 97%.     05/18/20  1653 05/20/20  0612 05/20/20  0628   BP:  120/79 119/81   Pulse:

## 2020-05-20 NOTE — OPERATIVE REPORT
[facility]    PATIENT'S NAME: Dell Murphy   ATTENDING PHYSICIAN: Anusha Newell MD   OPERATING PHYSICIAN: Anusha Newell MD   PATIENT ACCOUNT#:   [id number]    LOCATION:  [address]  MEDICAL RECORD #:   [id number]       DATE OF BIRTH: prepped and draped. A time out procedure was performed with the anesthesia and nursing staff to confirm patient identification, procedure and laterality. Diagnostic arthroscopy.   The glenohumeral joint revealed no articular damage to the cartilage or

## 2020-05-20 NOTE — INTERVAL H&P NOTE
Pre-op Diagnosis: partial rotator cuff tear    The above referenced H&P was reviewed by DANNY Alvarez PA on 5/20/2020, the patient was examined and no significant changes have occurred in the patient's condition since the H&P was performed.   I discussed

## 2020-05-20 NOTE — ANESTHESIA POSTPROCEDURE EVALUATION
Patient: Kylee Dugan    Procedure Summary     Date:  05/20/20 Room / Location:  [facility] OR 06 / [facility] OR    Anesthesia Start:  8409 Anesthesia Stop:  1131    Procedure:  SHOULDER ARTHROSCOPY ROTATOR CUFF REPAIR (Left ) Diagnosis:  (partial rotator c

## 2020-05-20 NOTE — ANESTHESIA PROCEDURE NOTES
Airway  Urgency: Elective      General Information and Staff    Patient location during procedure: OR  Anesthesiologist: Hong Beck MD  Resident/CRNA: Emigdio Lin CRNA  Performed: CRNA     Indications and Patient Condition  Indications for airwa

## 2020-05-20 NOTE — ANESTHESIA PREPROCEDURE EVALUATION
Anesthesia PreOp Note    HPI:     Albert Patricia is a 45year old male who presents for preoperative consultation requested by: Jaja Page MD    Date of Surgery: 5/20/2020    Procedure(s):  SHOULDER ARTHROSCOPY ROTATOR CUFF REPAIR  Indication: partial r Esme Powell PA    No current [facility]-ordered outpatient medications on file.       No Known Allergies    Family History   Problem Relation Age of Onset   • Diabetes Father    • Diabetes Mother    • Cancer Maternal Grandmother         colon     Social Hist weight is 143 lb. His oral temperature is 97.8 °F (36.6 °C). His blood pressure is 119/81 and his pulse is 80. His respiration is 15 and oxygen saturation is 97%.     05/18/20  1653 05/20/20  0612 05/20/20  0628   BP:  120/79 119/81   Pulse:  80 80   Resp:

## 2020-07-15 NOTE — Clinical Note
Initial assessment completed with patient. Pt declines ER f/u appt--prefers to rest at home. Patient has met goals, no further outreach needed.  Thank you! 
Patient

## 2021-09-22 ENCOUNTER — OFFICE VISIT (OUTPATIENT)
Dept: INTERNAL MEDICINE CLINIC | Facility: CLINIC | Age: 46
End: 2021-09-22
Payer: MEDICAID

## 2021-09-22 ENCOUNTER — LAB ENCOUNTER (OUTPATIENT)
Dept: LAB | Age: 46
End: 2021-09-22
Attending: INTERNAL MEDICINE
Payer: MEDICAID

## 2021-09-22 ENCOUNTER — HOSPITAL ENCOUNTER (OUTPATIENT)
Dept: GENERAL RADIOLOGY | Age: 46
Discharge: HOME | End: 2021-09-22
Attending: INTERNAL MEDICINE
Payer: MEDICAID

## 2021-09-22 VITALS
DIASTOLIC BLOOD PRESSURE: 69 MMHG | TEMPERATURE: 98 F | BODY MASS INDEX: 21.19 KG/M2 | WEIGHT: 148 LBS | HEIGHT: 70 IN | HEART RATE: 90 BPM | SYSTOLIC BLOOD PRESSURE: 106 MMHG | OXYGEN SATURATION: 98 %

## 2021-09-22 DIAGNOSIS — R06.02 SHORTNESS OF BREATH: ICD-10-CM

## 2021-09-22 DIAGNOSIS — Z80.0 FAMILY HISTORY OF COLON CANCER: ICD-10-CM

## 2021-09-22 DIAGNOSIS — R06.2 WHEEZING: ICD-10-CM

## 2021-09-22 DIAGNOSIS — L73.9 FOLLICULITIS: ICD-10-CM

## 2021-09-22 DIAGNOSIS — Z00.00 ANNUAL PHYSICAL EXAM: Primary | ICD-10-CM

## 2021-09-22 DIAGNOSIS — Z00.00 ANNUAL PHYSICAL EXAM: ICD-10-CM

## 2021-09-22 PROBLEM — R55 SYNCOPE: Status: RESOLVED | Noted: 2017-08-01 | Resolved: 2021-09-22

## 2021-09-22 LAB
ALBUMIN SERPL-MCNC: 3.7 G/DL (ref 3.4–5)
ALBUMIN/GLOB SERPL: 0.8 {RATIO} (ref 1–2)
ALP LIVER SERPL-CCNC: 42 U/L
ALT SERPL-CCNC: 14 U/L
ANION GAP SERPL CALC-SCNC: 4 MMOL/L (ref 0–18)
AST SERPL-CCNC: 12 U/L (ref 15–37)
BASOPHILS # BLD AUTO: 0.04 X10(3) UL (ref 0–0.2)
BASOPHILS NFR BLD AUTO: 0.7 %
BILIRUB SERPL-MCNC: 0.3 MG/DL (ref 0.1–2)
BUN BLD-MCNC: 12 MG/DL (ref 7–18)
BUN/CREAT SERPL: 9.6 (ref 10–20)
CALCIUM BLD-MCNC: 9.2 MG/DL (ref 8.5–10.1)
CHLORIDE SERPL-SCNC: 105 MMOL/L (ref 98–112)
CHOLEST SERPL-MCNC: 184 MG/DL (ref ?–200)
CO2 SERPL-SCNC: 29 MMOL/L (ref 21–32)
COMPLEXED PSA SERPL-MCNC: 0.42 NG/ML (ref ?–4)
CREAT BLD-MCNC: 1.25 MG/DL
DEPRECATED RDW RBC AUTO: 44 FL (ref 35.1–46.3)
EOSINOPHIL # BLD AUTO: 0.36 X10(3) UL (ref 0–0.7)
EOSINOPHIL NFR BLD AUTO: 6.2 %
ERYTHROCYTE [DISTWIDTH] IN BLOOD BY AUTOMATED COUNT: 13.1 % (ref 11–15)
GLOBULIN PLAS-MCNC: 4.5 G/DL (ref 2.8–4.4)
GLUCOSE BLD-MCNC: 93 MG/DL (ref 70–99)
HCT VFR BLD AUTO: 48.4 %
HDLC SERPL-MCNC: 55 MG/DL (ref 40–59)
HGB BLD-MCNC: 15.9 G/DL
IMM GRANULOCYTES # BLD AUTO: 0.01 X10(3) UL (ref 0–1)
IMM GRANULOCYTES NFR BLD: 0.2 %
LDLC SERPL CALC-MCNC: 118 MG/DL (ref ?–100)
LYMPHOCYTES # BLD AUTO: 2.79 X10(3) UL (ref 1–4)
LYMPHOCYTES NFR BLD AUTO: 47.9 %
MCH RBC QN AUTO: 30.2 PG (ref 26–34)
MCHC RBC AUTO-ENTMCNC: 32.9 G/DL (ref 31–37)
MCV RBC AUTO: 91.8 FL
MONOCYTES # BLD AUTO: 0.59 X10(3) UL (ref 0.1–1)
MONOCYTES NFR BLD AUTO: 10.1 %
NEUTROPHILS # BLD AUTO: 2.04 X10 (3) UL (ref 1.5–7.7)
NEUTROPHILS # BLD AUTO: 2.04 X10(3) UL (ref 1.5–7.7)
NEUTROPHILS NFR BLD AUTO: 34.9 %
NONHDLC SERPL-MCNC: 129 MG/DL (ref ?–130)
OSMOLALITY SERPL CALC.SUM OF ELEC: 285 MOSM/KG (ref 275–295)
PATIENT FASTING Y/N/NP: YES
PATIENT FASTING Y/N/NP: YES
PLATELET # BLD AUTO: 171 10(3)UL (ref 150–450)
POTASSIUM SERPL-SCNC: 3.8 MMOL/L (ref 3.5–5.1)
PROT SERPL-MCNC: 8.2 G/DL (ref 6.4–8.2)
RBC # BLD AUTO: 5.27 X10(6)UL
SODIUM SERPL-SCNC: 138 MMOL/L (ref 136–145)
TRIGL SERPL-MCNC: 56 MG/DL (ref 30–149)
VLDLC SERPL CALC-MCNC: 10 MG/DL (ref 0–30)
WBC # BLD AUTO: 5.8 X10(3) UL (ref 4–11)

## 2021-09-22 PROCEDURE — 3078F DIAST BP <80 MM HG: CPT | Performed by: INTERNAL MEDICINE

## 2021-09-22 PROCEDURE — 80061 LIPID PANEL: CPT

## 2021-09-22 PROCEDURE — 85025 COMPLETE CBC W/AUTO DIFF WBC: CPT

## 2021-09-22 PROCEDURE — 3074F SYST BP LT 130 MM HG: CPT | Performed by: INTERNAL MEDICINE

## 2021-09-22 PROCEDURE — 99396 PREV VISIT EST AGE 40-64: CPT | Performed by: INTERNAL MEDICINE

## 2021-09-22 PROCEDURE — 3008F BODY MASS INDEX DOCD: CPT | Performed by: INTERNAL MEDICINE

## 2021-09-22 PROCEDURE — 36415 COLL VENOUS BLD VENIPUNCTURE: CPT

## 2021-09-22 PROCEDURE — 71046 X-RAY EXAM CHEST 2 VIEWS: CPT | Performed by: INTERNAL MEDICINE

## 2021-09-22 PROCEDURE — 80053 COMPREHEN METABOLIC PANEL: CPT

## 2021-09-22 RX ORDER — CEPHALEXIN 500 MG/1
500 CAPSULE ORAL 2 TIMES DAILY
Qty: 14 CAPSULE | Refills: 0 | Status: SHIPPED | OUTPATIENT
Start: 2021-09-22 | End: 2021-11-10 | Stop reason: ALTCHOICE

## 2021-09-22 RX ORDER — ALBUTEROL SULFATE 90 UG/1
2 AEROSOL, METERED RESPIRATORY (INHALATION) EVERY 6 HOURS PRN
Qty: 1 EACH | Refills: 0 | Status: SHIPPED | OUTPATIENT
Start: 2021-09-22

## 2021-09-22 NOTE — PROGRESS NOTES
Subjective:     Patient ID: Harvinder Wright is a 42year old male. Patient presents today for his annual checkup. Patient did complain of having 2 bumps in his upper neck after having had shaving done recently. There has been some drainage noted.   No t Allergic/Immunologic: Negative for food allergies. Hematological: Negative.       Current Outpatient Medications   Medication Sig Dispense Refill   • ondansetron (ZOFRAN) 4 mg tablet Take 1 tablet (4 mg total) by mouth every 8 (eight) hours as needed fo Comment: 1-2 x daily       Objective:   Physical Exam  Constitutional:       General: He is not in acute distress. Appearance: Normal appearance. He is not ill-appearing, toxic-appearing or diaphoretic.    HENT:      Head: Normocephalic and atraumati [facility]       Patient was given referral to see GI to have his screening colonoscopy given his family history of colon cancer.    (R06.02) Shortness of breath  Plan: XR CHEST PA + LAT CHEST (CPT=71046), COMPLETE         PFT, SARS-COV-2 BY PCR (AL

## 2021-09-28 ENCOUNTER — TELEPHONE (OUTPATIENT)
Dept: INTERNAL MEDICINE CLINIC | Facility: CLINIC | Age: 46
End: 2021-09-28

## 2021-09-28 DIAGNOSIS — R77.1 HYPERGLOBULINEMIA: Primary | ICD-10-CM

## 2021-09-28 DIAGNOSIS — R91.8 PULMONARY NODULES: ICD-10-CM

## 2021-09-28 DIAGNOSIS — R06.2 WHEEZING: ICD-10-CM

## 2021-09-30 ENCOUNTER — TELEPHONE (OUTPATIENT)
Dept: PULMONOLOGY | Facility: CLINIC | Age: 46
End: 2021-09-30

## 2021-09-30 NOTE — TELEPHONE ENCOUNTER
Elida called in stating she received a ph call about pt having a sooner appt on October 22nd. I do not see any availability or notes in regards to this.  Please call

## 2021-09-30 NOTE — TELEPHONE ENCOUNTER
Spoke with patient's wife Deya. Appointment scheduled for 10/22 at 10:10AM. Verified date, time, place, and where to park. Wife verbalized understanding.

## 2021-10-11 ENCOUNTER — LAB ENCOUNTER (OUTPATIENT)
Dept: LAB | Facility: HOSPITAL | Age: 46
End: 2021-10-11
Attending: INTERNAL MEDICINE
Payer: MEDICAID

## 2021-10-11 DIAGNOSIS — R06.02 SHORTNESS OF BREATH: ICD-10-CM

## 2021-10-11 DIAGNOSIS — R06.2 WHEEZING: ICD-10-CM

## 2021-10-14 ENCOUNTER — HOSPITAL ENCOUNTER (OUTPATIENT)
Dept: RESPIRATORY THERAPY | Facility: HOSPITAL | Age: 46
Discharge: HOME | End: 2021-10-14
Attending: INTERNAL MEDICINE
Payer: MEDICAID

## 2021-10-14 ENCOUNTER — LAB ENCOUNTER (OUTPATIENT)
Dept: LAB | Facility: HOSPITAL | Age: 46
End: 2021-10-14
Attending: INTERNAL MEDICINE
Payer: MEDICAID

## 2021-10-14 DIAGNOSIS — R06.2 WHEEZING: ICD-10-CM

## 2021-10-14 DIAGNOSIS — R77.1 HYPERGLOBULINEMIA: ICD-10-CM

## 2021-10-14 DIAGNOSIS — R06.02 SHORTNESS OF BREATH: ICD-10-CM

## 2021-10-14 PROCEDURE — 86334 IMMUNOFIX E-PHORESIS SERUM: CPT

## 2021-10-14 PROCEDURE — 36415 COLL VENOUS BLD VENIPUNCTURE: CPT

## 2021-10-14 PROCEDURE — 94726 PLETHYSMOGRAPHY LUNG VOLUMES: CPT | Performed by: INTERNAL MEDICINE

## 2021-10-14 PROCEDURE — 94729 DIFFUSING CAPACITY: CPT | Performed by: INTERNAL MEDICINE

## 2021-10-14 PROCEDURE — 94060 EVALUATION OF WHEEZING: CPT | Performed by: INTERNAL MEDICINE

## 2021-10-14 PROCEDURE — 84165 PROTEIN E-PHORESIS SERUM: CPT

## 2021-10-21 NOTE — PROCEDURES
[facility]     Pulmonary Function Test     Francia Austin Patient Status:  Outpatient    1975 MRN [id number]   Date of Exam 10/14/21 PCP Brandie Cool MD           Spirometry   FEV1: 0.88 22%  FVC: 2.02 41%  FEV1/FVC: 0.43

## 2021-10-21 NOTE — ADDENDUM NOTE
Encounter addended by: Eufemia Kelly DO on: 10/21/2021 12:15 PM   Actions taken: Clinical Note Signed, Charge Capture section accepted

## 2021-10-22 ENCOUNTER — OFFICE VISIT (OUTPATIENT)
Dept: PULMONOLOGY | Facility: CLINIC | Age: 46
End: 2021-10-22
Payer: MEDICAID

## 2021-10-22 VITALS
TEMPERATURE: 98 F | SYSTOLIC BLOOD PRESSURE: 108 MMHG | OXYGEN SATURATION: 94 % | DIASTOLIC BLOOD PRESSURE: 73 MMHG | WEIGHT: 153.81 LBS | HEART RATE: 91 BPM | BODY MASS INDEX: 22.02 KG/M2 | RESPIRATION RATE: 18 BRPM | HEIGHT: 70 IN

## 2021-10-22 DIAGNOSIS — R91.8 LUNG NODULES: Primary | ICD-10-CM

## 2021-10-22 DIAGNOSIS — J44.9 STAGE 4 VERY SEVERE COPD BY GOLD CLASSIFICATION (HCC): ICD-10-CM

## 2021-10-22 PROCEDURE — 3074F SYST BP LT 130 MM HG: CPT | Performed by: INTERNAL MEDICINE

## 2021-10-22 PROCEDURE — 3008F BODY MASS INDEX DOCD: CPT | Performed by: INTERNAL MEDICINE

## 2021-10-22 PROCEDURE — 99244 OFF/OP CNSLTJ NEW/EST MOD 40: CPT | Performed by: INTERNAL MEDICINE

## 2021-10-22 PROCEDURE — 3078F DIAST BP <80 MM HG: CPT | Performed by: INTERNAL MEDICINE

## 2021-10-22 RX ORDER — FLUTICASONE FUROATE, UMECLIDINIUM BROMIDE AND VILANTEROL TRIFENATATE 100; 62.5; 25 UG/1; UG/1; UG/1
1 POWDER RESPIRATORY (INHALATION) DAILY
Qty: 1 EACH | Refills: 5 | Status: SHIPPED | OUTPATIENT
Start: 2021-10-22 | End: 2021-11-17

## 2021-10-22 RX ORDER — IPRATROPIUM BROMIDE AND ALBUTEROL SULFATE 2.5; .5 MG/3ML; MG/3ML
3 SOLUTION RESPIRATORY (INHALATION) 3 TIMES DAILY PRN
Qty: 60 EACH | Refills: 5 | Status: SHIPPED | OUTPATIENT
Start: 2021-10-22

## 2021-10-22 NOTE — H&P
Referring Physician  Emilia Jerez MD    Chief Complaint  Dyspnea, cough, wheeze    History of Present Illness  Patient is a 14-year-old male who presents today for evaluation of worsening dyspnea, cough and wheezing.   Admits to some increased cough • No Known Problems Sister         Social History  Tobacco: 20-pack-year history of tobacco abuse.   Quit 1 month ago  Alcohol: Occasional  Illicit Drugs: Prior history of cannabis use    Medications  Albuterol Sulfate HFA (PROAIR HFA) 108 (90 Base) MCG/A bilateral expiratory wheeze present  GI: abdomen soft, non tender, active bowel sounds, no organomegaly  Extremities: no clubbing, cyanosis, edema  Neurologic: no gross motor deficits  Skin: warm, dry  Lymphatic: no supraclavicular lymphadenopathy     Imag

## 2021-10-27 ENCOUNTER — TELEPHONE (OUTPATIENT)
Dept: PULMONOLOGY | Facility: CLINIC | Age: 46
End: 2021-10-27

## 2021-10-27 ENCOUNTER — HOSPITAL ENCOUNTER (OUTPATIENT)
Dept: CT IMAGING | Age: 46
Discharge: HOME | End: 2021-10-27
Attending: INTERNAL MEDICINE
Payer: MEDICAID

## 2021-10-27 DIAGNOSIS — R91.8 LUNG NODULES: ICD-10-CM

## 2021-10-27 PROCEDURE — 71250 CT THORAX DX C-: CPT | Performed by: INTERNAL MEDICINE

## 2021-10-28 NOTE — H&P
[address] [facility] [address] Gastroenterology                                                                                                  Clinic History and Physical     Pa Social History: Social History    Tobacco Use      Smoking status: Former Smoker        Packs/day: 0.50      Smokeless tobacco: Never Used    Vaping Use      Vaping Use: Never used    Alcohol use: Yes    Drug use: Yes      Frequency: 7.0 times per week membranes appear moist  CV: regular rate and rhythm, the extremities are warm and well perfused   Lung: effort normal and breath sounds normal, no respiratory distress, wheezes or rales  GI: soft, non-tender exam in all quadrants without rigidity or guardi equivalent  -Anti-platelets and anti-coagulants: None  -Diabetes meds: None    ** If MAC:    - HOLD ACE/ARBs the night before and/or the day of the procedure(s) - N/A   - NO alcohol, recreational drugs nor erectile dysfunction medications 24 hours before p

## 2021-11-05 ENCOUNTER — TELEPHONE (OUTPATIENT)
Dept: PULMONOLOGY | Facility: CLINIC | Age: 46
End: 2021-11-05

## 2021-11-09 ENCOUNTER — TELEPHONE (OUTPATIENT)
Dept: PULMONOLOGY | Facility: CLINIC | Age: 46
End: 2021-11-09

## 2021-11-09 DIAGNOSIS — R91.8 OPACITIES OF BOTH LUNGS PRESENT ON CHEST X-RAY: Primary | ICD-10-CM

## 2021-11-09 DIAGNOSIS — R91.1 LUNG NODULE: ICD-10-CM

## 2021-11-09 NOTE — TELEPHONE ENCOUNTER
Discussed CT findings with the patient. Recommend follow-up CT chest in 6 months duration. Will monitor response to Trelegy. Advised patient to follow-up in office in January 2022.

## 2021-11-09 NOTE — TELEPHONE ENCOUNTER
Medication PA Requested:    fluticasone-umeclidin-vilant (TRELEGY ELLIPTA) 100-62.5-25 MCG/INH Inhalation Aerosol Powder, Breath Activated                                                      CoverMyMeds Used:  Key:  Quantity:  Day Supply:  Sig: Inhale 1 p

## 2021-11-10 ENCOUNTER — TELEPHONE (OUTPATIENT)
Dept: GASTROENTEROLOGY | Facility: CLINIC | Age: 46
End: 2021-11-10

## 2021-11-10 ENCOUNTER — OFFICE VISIT (OUTPATIENT)
Dept: GASTROENTEROLOGY | Facility: CLINIC | Age: 46
End: 2021-11-10
Payer: MEDICAID

## 2021-11-10 VITALS
BODY MASS INDEX: 22.48 KG/M2 | SYSTOLIC BLOOD PRESSURE: 107 MMHG | HEIGHT: 70 IN | HEART RATE: 74 BPM | DIASTOLIC BLOOD PRESSURE: 71 MMHG | TEMPERATURE: 99 F | WEIGHT: 157 LBS

## 2021-11-10 DIAGNOSIS — Z12.11 SCREENING FOR COLON CANCER: Primary | ICD-10-CM

## 2021-11-10 DIAGNOSIS — Z80.0 FAMILY HISTORY OF COLON CANCER: ICD-10-CM

## 2021-11-10 PROCEDURE — 3078F DIAST BP <80 MM HG: CPT | Performed by: NURSE PRACTITIONER

## 2021-11-10 PROCEDURE — 3008F BODY MASS INDEX DOCD: CPT | Performed by: NURSE PRACTITIONER

## 2021-11-10 PROCEDURE — 3074F SYST BP LT 130 MM HG: CPT | Performed by: NURSE PRACTITIONER

## 2021-11-10 PROCEDURE — 99243 OFF/OP CNSLTJ NEW/EST LOW 30: CPT | Performed by: NURSE PRACTITIONER

## 2021-11-10 RX ORDER — POLYETHYLENE GLYCOL 3350, SODIUM CHLORIDE, SODIUM BICARBONATE, POTASSIUM CHLORIDE 420; 11.2; 5.72; 1.48 G/4L; G/4L; G/4L; G/4L
POWDER, FOR SOLUTION ORAL
Qty: 4000 ML | Refills: 0 | Status: SHIPPED | OUTPATIENT
Start: 2021-11-10

## 2021-11-10 NOTE — TELEPHONE ENCOUNTER
Noted. Order added to chronic calender. Patient has follow-up appointment scheduled with Dr. Duane Gonzales on 1/18/22.

## 2021-11-10 NOTE — PATIENT INSTRUCTIONS
-Schedule colonoscopy w/ Dr. Hilary Garibay or Dr. Roman Le with MAC due to anticipated high sedation requirements  Dx: screening, [address] [facility]   -Eligible for NE: No r/t use of cannabis  -Prep: Split dose Colyte/TriLyte or equivalent  -Anti-platelets and anti-coagulan

## 2021-11-10 NOTE — TELEPHONE ENCOUNTER
Scheduled for:  Colonoscopy 93177  Provider Name:  Dr Brendan Meyer  Date:  06/13/2022  Location:  [facility]  Sedation:  MAC  Time:  0730 (pt is aware to arrive at 0630)  Prep: Colyte    Meds/Allergies Reconciled?:  Physician reviewed  Diagnosis with codes:  [place]

## 2021-11-16 RX ORDER — BUDESONIDE AND FORMOTEROL FUMARATE DIHYDRATE 160; 4.5 UG/1; UG/1
2 AEROSOL RESPIRATORY (INHALATION) 2 TIMES DAILY
Qty: 1 EACH | Refills: 5 | Status: SHIPPED | OUTPATIENT
Start: 2021-11-16

## 2021-11-16 NOTE — TELEPHONE ENCOUNTER
Per Evelyn Garcia @ [facility] PA for Gurney Schlatter was denied b/c member must have tried (x at least 2 wks) & had poor response to 2 preferred drugs, but member has not tried anything else.  Stts preferred alternatives are Atrovent HFA used in combinati

## 2021-11-16 NOTE — TELEPHONE ENCOUNTER
Pasquale Bee, [phone], spoke with Audi Dorantes. She states PA was denied 11/10 and letter with denial rationale was faxed to [phone]. I requested refax of denial letter to be sent.        Medication PA Requested:    fluticasone-umeclidin-vi

## 2021-11-17 ENCOUNTER — PATIENT MESSAGE (OUTPATIENT)
Dept: PULMONOLOGY | Facility: CLINIC | Age: 46
End: 2021-11-17

## 2021-11-17 NOTE — TELEPHONE ENCOUNTER
Cancelled rx for Sonny Rodriguez w/ Brianne Lauren @ [facility]. Explained Spiriva & Symbicort were ordered instead d/t pt's insurance coverage. She voiced understanding.

## 2021-11-17 NOTE — TELEPHONE ENCOUNTER
From: Bryan Norris  To: Darrell Lugo DO  Sent: 11/17/2021 10:12 AM CST  Subject: Prescription     Good morning,    I picked up the inhaler at the pharmacy and I was given 2 new ones. I wanted to confirm I need to be taking both of them.  Initially it

## 2021-11-17 NOTE — TELEPHONE ENCOUNTER
Discussed Dr. Fabio Bernabe orders below w/ pt. Explained PA for Bhumika Menard was denied by his insurance, scripts were sent to pharmacy, & rx for Bhumika Menard will be cancelled w/ pharmacy shortly.  Instructed him to contact our office if we may be of fu

## 2021-11-19 ENCOUNTER — NURSE TRIAGE (OUTPATIENT)
Dept: PULMONOLOGY | Facility: CLINIC | Age: 46
End: 2021-11-19

## 2021-11-19 ENCOUNTER — PATIENT MESSAGE (OUTPATIENT)
Dept: PULMONOLOGY | Facility: CLINIC | Age: 46
End: 2021-11-19

## 2021-11-19 ENCOUNTER — NURSE TRIAGE (OUTPATIENT)
Dept: INTERNAL MEDICINE CLINIC | Facility: CLINIC | Age: 46
End: 2021-11-19

## 2021-11-19 NOTE — TELEPHONE ENCOUNTER
Stts took Symbicort for 2nd time @ 9 pm (initially took at 9 am w/ Spiriva), had minimal chest pain in am, during the night after taking Symbicort had consistent sharp chest pain rated 7-8/10 which radiated down R side of chest lasting about 7 min, had dif

## 2021-11-19 NOTE — TELEPHONE ENCOUNTER
From: Steven Reyes  To: Raad Funes DO  Sent: 11/19/2021 7:56 AM CST  Subject: Medications    This message is being sent by Jennifer Juarez on behalf of Steven Reyes.     Good Morning,  I started taking the spiriva and symbicort yesterday and am now

## 2021-11-19 NOTE — TELEPHONE ENCOUNTER
Action Requested: Summary for Provider     []  Critical Lab, Recommendations Needed  [x] Need Additional Advice  []   FYI    []   Need Orders  [] Need Medications Sent to Pharmacy  []  Other     SUMMARY:  Patient states he was returning call to [facility]

## 2021-11-23 ENCOUNTER — TELEPHONE (OUTPATIENT)
Dept: PULMONOLOGY | Facility: CLINIC | Age: 46
End: 2021-11-23

## 2021-11-24 NOTE — TELEPHONE ENCOUNTER
Prudencio ANN    6:27 PM  Note  Per pt he is back to normal & has no sx including pain since stopping Symbicort & Spiriva. Explained will d/w Dr. Nati Merrill & f/u. Pt had no further concerns/questions at this time. Spoke to Dr. Nati Merrill re: below & the previous.  P Im unable to lay on the side and back without it causing discomfort.            Jacquelyn Mena  to Ely Dickens DO          7:56 AM  This message is being sent by Shiloh Meneses on behalf of Frankie Bhat.     Good Morning,  I started taking the sp

## 2021-11-24 NOTE — TELEPHONE ENCOUNTER
Per pt he is back to normal & has no sx including pain since stopping Symbicort & Spiriva. Explained will d/w Dr. Brandon Todd & f/u. Pt had no further concerns/questions at this time. Spoke to Dr. Brandon Todd re: below & the previous.  Per Dr. Brandon Todd he will addre

## 2021-11-26 NOTE — TELEPHONE ENCOUNTER
Pt informed of Dr. Tawanda Schilling orders below. Reassurance provided. He voiced understanding & was agreeable. Confirmed pt still not having any sx since stopping Symbicort & Spiriva.

## 2022-01-18 ENCOUNTER — OFFICE VISIT (OUTPATIENT)
Dept: PULMONOLOGY | Facility: CLINIC | Age: 47
End: 2022-01-18
Payer: MEDICAID

## 2022-01-18 VITALS
OXYGEN SATURATION: 97 % | HEIGHT: 70 IN | WEIGHT: 175 LBS | RESPIRATION RATE: 16 BRPM | SYSTOLIC BLOOD PRESSURE: 102 MMHG | DIASTOLIC BLOOD PRESSURE: 69 MMHG | BODY MASS INDEX: 25.05 KG/M2 | HEART RATE: 67 BPM

## 2022-01-18 DIAGNOSIS — R91.1 LUNG NODULE: Primary | ICD-10-CM

## 2022-01-18 PROBLEM — J44.9 CHRONIC OBSTRUCTIVE PULMONARY DISEASE, UNSPECIFIED (HCC): Status: ACTIVE | Noted: 2022-01-18

## 2022-01-18 PROCEDURE — 3074F SYST BP LT 130 MM HG: CPT | Performed by: INTERNAL MEDICINE

## 2022-01-18 PROCEDURE — 3078F DIAST BP <80 MM HG: CPT | Performed by: INTERNAL MEDICINE

## 2022-01-18 PROCEDURE — 3008F BODY MASS INDEX DOCD: CPT | Performed by: INTERNAL MEDICINE

## 2022-01-18 PROCEDURE — 99213 OFFICE O/P EST LOW 20 MIN: CPT | Performed by: INTERNAL MEDICINE

## 2022-01-18 NOTE — PROGRESS NOTES
Referring Physician  Melvin Stallings MD    History of Present Illness  Seen today for follow-up visit in pulmonary clinic. Significant improvement in dyspnea symptoms after starting Symbicort.   Has not used butyryl MDI over the course of the last mon underlying dyspnea, cough and wheezing. I reviewed pulmonary function testing results with evidence of very severe obstructive lung disease seen.   Significant clinical improvement after starting maintenance inhaler therapy with Symbicort which I advised h

## 2022-02-16 ENCOUNTER — NURSE TRIAGE (OUTPATIENT)
Dept: INTERNAL MEDICINE CLINIC | Facility: CLINIC | Age: 47
End: 2022-02-16

## 2022-02-17 ENCOUNTER — TELEPHONE (OUTPATIENT)
Dept: PODIATRY CLINIC | Facility: CLINIC | Age: 47
End: 2022-02-17

## 2022-02-17 ENCOUNTER — LAB ENCOUNTER (OUTPATIENT)
Dept: LAB | Age: 47
End: 2022-02-17
Attending: INTERNAL MEDICINE
Payer: MEDICAID

## 2022-02-17 ENCOUNTER — APPOINTMENT (OUTPATIENT)
Dept: GENERAL RADIOLOGY | Facility: HOSPITAL | Age: 47
End: 2022-02-17
Attending: STUDENT IN AN ORGANIZED HEALTH CARE EDUCATION/TRAINING PROGRAM
Payer: MEDICAID

## 2022-02-17 ENCOUNTER — HOSPITAL ENCOUNTER (EMERGENCY)
Facility: HOSPITAL | Age: 47
Discharge: HOME | End: 2022-02-17
Attending: STUDENT IN AN ORGANIZED HEALTH CARE EDUCATION/TRAINING PROGRAM
Payer: MEDICAID

## 2022-02-17 ENCOUNTER — OFFICE VISIT (OUTPATIENT)
Dept: INTERNAL MEDICINE CLINIC | Facility: CLINIC | Age: 47
End: 2022-02-17
Payer: MEDICAID

## 2022-02-17 ENCOUNTER — HOSPITAL ENCOUNTER (OUTPATIENT)
Dept: GENERAL RADIOLOGY | Age: 47
Discharge: HOME | End: 2022-02-17
Attending: INTERNAL MEDICINE
Payer: MEDICAID

## 2022-02-17 VITALS
WEIGHT: 175 LBS | SYSTOLIC BLOOD PRESSURE: 119 MMHG | TEMPERATURE: 98 F | DIASTOLIC BLOOD PRESSURE: 79 MMHG | RESPIRATION RATE: 18 BRPM | HEART RATE: 78 BPM | OXYGEN SATURATION: 99 % | BODY MASS INDEX: 25 KG/M2

## 2022-02-17 VITALS
HEIGHT: 70 IN | BODY MASS INDEX: 25.48 KG/M2 | DIASTOLIC BLOOD PRESSURE: 67 MMHG | SYSTOLIC BLOOD PRESSURE: 95 MMHG | OXYGEN SATURATION: 98 % | HEART RATE: 80 BPM | WEIGHT: 178 LBS

## 2022-02-17 DIAGNOSIS — R07.9 CHEST PAIN, UNSPECIFIED TYPE: ICD-10-CM

## 2022-02-17 DIAGNOSIS — R00.2 PALPITATIONS: ICD-10-CM

## 2022-02-17 DIAGNOSIS — R10.13 EPIGASTRIC PAIN: ICD-10-CM

## 2022-02-17 DIAGNOSIS — I49.3 PVC (PREMATURE VENTRICULAR CONTRACTION): ICD-10-CM

## 2022-02-17 DIAGNOSIS — S99.911A INJURY OF RIGHT ANKLE, INITIAL ENCOUNTER: ICD-10-CM

## 2022-02-17 DIAGNOSIS — S99.911A INJURY OF RIGHT ANKLE, INITIAL ENCOUNTER: Primary | ICD-10-CM

## 2022-02-17 DIAGNOSIS — Z23 FLU VACCINE NEED: ICD-10-CM

## 2022-02-17 DIAGNOSIS — S82.841A BIMALLEOLAR FRACTURE OF RIGHT ANKLE, CLOSED, INITIAL ENCOUNTER: Primary | ICD-10-CM

## 2022-02-17 LAB
ALBUMIN/GLOB SERPL: 0.9 {RATIO} (ref 1–2)
ALP LIVER SERPL-CCNC: 43 U/L
ALT SERPL-CCNC: 41 U/L
ANION GAP SERPL CALC-SCNC: 8 MMOL/L (ref 0–18)
AST SERPL-CCNC: 25 U/L (ref 15–37)
BILIRUB SERPL-MCNC: 0.6 MG/DL (ref 0.1–2)
BUN BLD-MCNC: 10 MG/DL (ref 7–18)
BUN/CREAT SERPL: 8.1 (ref 10–20)
CALCIUM BLD-MCNC: 8.9 MG/DL (ref 8.5–10.1)
CHLORIDE SERPL-SCNC: 104 MMOL/L (ref 98–112)
CO2 SERPL-SCNC: 28 MMOL/L (ref 21–32)
DEPRECATED RDW RBC AUTO: 45.7 FL (ref 35.1–46.3)
ERYTHROCYTE [DISTWIDTH] IN BLOOD BY AUTOMATED COUNT: 13.4 % (ref 11–15)
FASTING STATUS PATIENT QL REPORTED: NO
GLOBULIN PLAS-MCNC: 4 G/DL (ref 2.8–4.4)
GLUCOSE BLD-MCNC: 99 MG/DL (ref 70–99)
HCT VFR BLD AUTO: 45.7 %
HGB BLD-MCNC: 15 G/DL
MAGNESIUM SERPL-MCNC: 2.2 MG/DL (ref 1.6–2.6)
MCH RBC QN AUTO: 30.3 PG (ref 26–34)
MCHC RBC AUTO-ENTMCNC: 32.8 G/DL (ref 31–37)
MCV RBC AUTO: 92.3 FL
OSMOLALITY SERPL CALC.SUM OF ELEC: 289 MOSM/KG (ref 275–295)
PLATELET # BLD AUTO: 169 10(3)UL (ref 150–450)
POTASSIUM SERPL-SCNC: 4.2 MMOL/L (ref 3.5–5.1)
PROT SERPL-MCNC: 7.7 G/DL (ref 6.4–8.2)
RBC # BLD AUTO: 4.95 X10(6)UL
SODIUM SERPL-SCNC: 140 MMOL/L (ref 136–145)
TSI SER-ACNC: 1.2 MIU/ML (ref 0.36–3.74)
WBC # BLD AUTO: 6.8 X10(3) UL (ref 4–11)

## 2022-02-17 PROCEDURE — 3074F SYST BP LT 130 MM HG: CPT | Performed by: INTERNAL MEDICINE

## 2022-02-17 PROCEDURE — E0114 CRUTCH UNDERARM PAIR NO WOOD: HCPCS | Performed by: INTERNAL MEDICINE

## 2022-02-17 PROCEDURE — 83735 ASSAY OF MAGNESIUM: CPT

## 2022-02-17 PROCEDURE — 93005 ELECTROCARDIOGRAM TRACING: CPT

## 2022-02-17 PROCEDURE — 99284 EMERGENCY DEPT VISIT MOD MDM: CPT

## 2022-02-17 PROCEDURE — 85027 COMPLETE CBC AUTOMATED: CPT

## 2022-02-17 PROCEDURE — 99214 OFFICE O/P EST MOD 30 MIN: CPT | Performed by: INTERNAL MEDICINE

## 2022-02-17 PROCEDURE — 3008F BODY MASS INDEX DOCD: CPT | Performed by: INTERNAL MEDICINE

## 2022-02-17 PROCEDURE — 27810 TREATMENT OF ANKLE FRACTURE: CPT

## 2022-02-17 PROCEDURE — 84443 ASSAY THYROID STIM HORMONE: CPT

## 2022-02-17 PROCEDURE — 73610 X-RAY EXAM OF ANKLE: CPT | Performed by: STUDENT IN AN ORGANIZED HEALTH CARE EDUCATION/TRAINING PROGRAM

## 2022-02-17 PROCEDURE — 93010 ELECTROCARDIOGRAM REPORT: CPT | Performed by: INTERNAL MEDICINE

## 2022-02-17 PROCEDURE — 73610 X-RAY EXAM OF ANKLE: CPT | Performed by: INTERNAL MEDICINE

## 2022-02-17 PROCEDURE — 80053 COMPREHEN METABOLIC PANEL: CPT

## 2022-02-17 PROCEDURE — 36415 COLL VENOUS BLD VENIPUNCTURE: CPT

## 2022-02-17 PROCEDURE — L4387 NON-PNEUM WALK BOOT PRE OTS: HCPCS | Performed by: INTERNAL MEDICINE

## 2022-02-17 PROCEDURE — 3078F DIAST BP <80 MM HG: CPT | Performed by: INTERNAL MEDICINE

## 2022-02-17 RX ORDER — HYDROCODONE BITARTRATE AND ACETAMINOPHEN 5; 325 MG/1; MG/1
2 TABLET ORAL ONCE
Status: COMPLETED | OUTPATIENT
Start: 2022-02-17 | End: 2022-02-17

## 2022-02-17 RX ORDER — PANTOPRAZOLE SODIUM 40 MG/1
40 TABLET, DELAYED RELEASE ORAL
Qty: 30 TABLET | Refills: 1 | Status: SHIPPED | OUTPATIENT
Start: 2022-02-17 | End: 2022-03-19

## 2022-02-17 NOTE — TELEPHONE ENCOUNTER
Dr. Lynette Staley you have an opening on Tuesday. Ok to wait until then or would you like me to [facility] him tomorrow?

## 2022-02-17 NOTE — TELEPHONE ENCOUNTER
Pt's wife returned the call. Declined sooner appointment with Dr. Marti Casper tomorrow 2-18-22 at 2:45pm.  Marry Kehr to keep the appointment scheduled on 2-23-22.

## 2022-02-17 NOTE — TELEPHONE ENCOUNTER
Next Tuesday is fine, tell patient no-weight bearing, ice, elevation. Please fill out a surgery card for this patient and put it in my folder. I will add him on for surgery next Thursday.

## 2022-02-17 NOTE — PATIENT INSTRUCTIONS
For pain control you can use 1 g of Tylenol i.e. 1000 mg every 8 hours, for the next few days I would recommend taking this every 8 hours to alleviate your pain and then after day 3 and 4 you can start to back off. The maximum dose is technically 4 g per 24 hours but try not to exceed 3.5grams per 24 hours.

## 2022-02-17 NOTE — TELEPHONE ENCOUNTER
Chart notes reviewed. Patient was immobilized and advised NWB and given crutches. Dr. Bryan Landeros seeing you next week.

## 2022-02-17 NOTE — TELEPHONE ENCOUNTER
There are openings in [employer]. Patient may be seen there if he wishes. Left message to call back.

## 2022-02-17 NOTE — ED INITIAL ASSESSMENT (HPI)
Patient went to doctor and had xray done of right foot today. \"notes two displaced fracture in foot\"  Arrives in boot. No pain medication prior.

## 2022-02-17 NOTE — TELEPHONE ENCOUNTER
Pt's wife called. Pt went to [facility] emergency room today 2-17-22 fracture right ankle. Scheduled an appointment on 2-23-22. Call asking for pt to be seen sooner.   Call

## 2022-02-17 NOTE — TELEPHONE ENCOUNTER
I reviewed the xrays and he will need ankle ORIF. Pt has medicaid so will need to be done at main OR and I do not have osseous ankle privileges at the main OR. Please have him seen by ortho next week.

## 2022-02-18 ENCOUNTER — TELEPHONE (OUTPATIENT)
Dept: PULMONOLOGY | Facility: CLINIC | Age: 47
End: 2022-02-18

## 2022-02-18 NOTE — TELEPHONE ENCOUNTER
Patient is due for CT Chest on 4/9/22  Ordered on 11/9/21  Letter sent to patient. Manage care please obtain PA for patient.  Thanks

## 2022-02-22 ENCOUNTER — OFFICE VISIT (OUTPATIENT)
Dept: ORTHOPEDICS CLINIC | Facility: CLINIC | Age: 47
End: 2022-02-22
Payer: MEDICAID

## 2022-02-22 ENCOUNTER — LAB ENCOUNTER (OUTPATIENT)
Dept: LAB | Facility: HOSPITAL | Age: 47
End: 2022-02-22
Attending: ORTHOPAEDIC SURGERY
Payer: MEDICAID

## 2022-02-22 ENCOUNTER — TELEPHONE (OUTPATIENT)
Dept: ORTHOPEDICS CLINIC | Facility: CLINIC | Age: 47
End: 2022-02-22

## 2022-02-22 DIAGNOSIS — S82.841A CLOSED BIMALLEOLAR FRACTURE OF RIGHT ANKLE, INITIAL ENCOUNTER: ICD-10-CM

## 2022-02-22 DIAGNOSIS — S82.841A CLOSED BIMALLEOLAR FRACTURE OF RIGHT ANKLE, INITIAL ENCOUNTER: Primary | ICD-10-CM

## 2022-02-22 PROCEDURE — 99244 OFF/OP CNSLTJ NEW/EST MOD 40: CPT | Performed by: ORTHOPAEDIC SURGERY

## 2022-02-22 NOTE — TELEPHONE ENCOUNTER
Type of surgery:  Right ankle bimalleolar ankle fracture ORIF  Date: 2/24/22  Location: [facility]  Medical Clearance: No Dr James Wild     *Medical:      *Dental:      *Other:  Prior Authorization Status: STAT  Workers Comp:  Medacta/Valeria:  Moville: Yes  POV: 3/14/22

## 2022-02-23 LAB — SARS-COV-2 RNA RESP QL NAA+PROBE: NOT DETECTED

## 2022-02-24 ENCOUNTER — HOSPITAL ENCOUNTER (OUTPATIENT)
Facility: HOSPITAL | Age: 47
Setting detail: HOSPITAL OUTPATIENT SURGERY
Discharge: HOME | End: 2022-02-24
Attending: ORTHOPAEDIC SURGERY | Admitting: ORTHOPAEDIC SURGERY
Payer: MEDICAID

## 2022-02-24 ENCOUNTER — APPOINTMENT (OUTPATIENT)
Dept: GENERAL RADIOLOGY | Facility: HOSPITAL | Age: 47
End: 2022-02-24
Attending: ORTHOPAEDIC SURGERY
Payer: MEDICAID

## 2022-02-24 ENCOUNTER — ANESTHESIA (OUTPATIENT)
Dept: SURGERY | Facility: HOSPITAL | Age: 47
End: 2022-02-24
Payer: MEDICAID

## 2022-02-24 ENCOUNTER — ANESTHESIA EVENT (OUTPATIENT)
Dept: SURGERY | Facility: HOSPITAL | Age: 47
End: 2022-02-24
Payer: MEDICAID

## 2022-02-24 VITALS
DIASTOLIC BLOOD PRESSURE: 65 MMHG | OXYGEN SATURATION: 95 % | HEART RATE: 77 BPM | BODY MASS INDEX: 24.05 KG/M2 | RESPIRATION RATE: 18 BRPM | WEIGHT: 168 LBS | SYSTOLIC BLOOD PRESSURE: 115 MMHG | HEIGHT: 70 IN | TEMPERATURE: 98 F

## 2022-02-24 DIAGNOSIS — S82.841A CLOSED BIMALLEOLAR FRACTURE OF RIGHT ANKLE, INITIAL ENCOUNTER: ICD-10-CM

## 2022-02-24 PROCEDURE — 27792 TREATMENT OF ANKLE FRACTURE: CPT | Performed by: ORTHOPAEDIC SURGERY

## 2022-02-24 PROCEDURE — 76000 FLUOROSCOPY <1 HR PHYS/QHP: CPT | Performed by: ORTHOPAEDIC SURGERY

## 2022-02-24 PROCEDURE — 0QSJ04Z REPOSITION RIGHT FIBULA WITH INTERNAL FIXATION DEVICE, OPEN APPROACH: ICD-10-PCS | Performed by: ORTHOPAEDIC SURGERY

## 2022-02-24 DEVICE — IMPLANTABLE DEVICE: Type: IMPLANTABLE DEVICE | Status: FUNCTIONAL

## 2022-02-24 RX ORDER — DEXAMETHASONE SODIUM PHOSPHATE 4 MG/ML
VIAL (ML) INJECTION AS NEEDED
Status: DISCONTINUED | OUTPATIENT
Start: 2022-02-24 | End: 2022-02-24 | Stop reason: SURG

## 2022-02-24 RX ORDER — SODIUM CHLORIDE, SODIUM LACTATE, POTASSIUM CHLORIDE, CALCIUM CHLORIDE 600; 310; 30; 20 MG/100ML; MG/100ML; MG/100ML; MG/100ML
INJECTION, SOLUTION INTRAVENOUS CONTINUOUS
Status: DISCONTINUED | OUTPATIENT
Start: 2022-02-24 | End: 2022-02-24

## 2022-02-24 RX ORDER — NALOXONE HYDROCHLORIDE 0.4 MG/ML
80 INJECTION, SOLUTION INTRAMUSCULAR; INTRAVENOUS; SUBCUTANEOUS AS NEEDED
Status: DISCONTINUED | OUTPATIENT
Start: 2022-02-24 | End: 2022-02-24

## 2022-02-24 RX ORDER — CEFAZOLIN SODIUM/WATER 2 G/20 ML
SYRINGE (ML) INTRAVENOUS AS NEEDED
Status: DISCONTINUED | OUTPATIENT
Start: 2022-02-24 | End: 2022-02-24 | Stop reason: SURG

## 2022-02-24 RX ORDER — MORPHINE SULFATE 4 MG/ML
2 INJECTION, SOLUTION INTRAMUSCULAR; INTRAVENOUS EVERY 10 MIN PRN
Status: DISCONTINUED | OUTPATIENT
Start: 2022-02-24 | End: 2022-02-24

## 2022-02-24 RX ORDER — ROPIVACAINE HYDROCHLORIDE 5 MG/ML
INJECTION, SOLUTION EPIDURAL; INFILTRATION; PERINEURAL
Status: COMPLETED | OUTPATIENT
Start: 2022-02-24 | End: 2022-02-24

## 2022-02-24 RX ORDER — MORPHINE SULFATE 10 MG/ML
6 INJECTION, SOLUTION INTRAMUSCULAR; INTRAVENOUS EVERY 10 MIN PRN
Status: DISCONTINUED | OUTPATIENT
Start: 2022-02-24 | End: 2022-02-24

## 2022-02-24 RX ORDER — ACETAMINOPHEN 500 MG
1000 TABLET ORAL ONCE
Status: DISCONTINUED | OUTPATIENT
Start: 2022-02-24 | End: 2022-02-24 | Stop reason: HOSPADM

## 2022-02-24 RX ORDER — ACETAMINOPHEN 500 MG
1000 TABLET ORAL EVERY 8 HOURS
OUTPATIENT
Start: 2022-02-24 | End: 2022-02-25

## 2022-02-24 RX ORDER — HYDROCODONE BITARTRATE AND ACETAMINOPHEN 5; 325 MG/1; MG/1
2 TABLET ORAL AS NEEDED
Status: DISCONTINUED | OUTPATIENT
Start: 2022-02-24 | End: 2022-02-24

## 2022-02-24 RX ORDER — HYDROMORPHONE HYDROCHLORIDE 1 MG/ML
0.2 INJECTION, SOLUTION INTRAMUSCULAR; INTRAVENOUS; SUBCUTANEOUS EVERY 5 MIN PRN
Status: DISCONTINUED | OUTPATIENT
Start: 2022-02-24 | End: 2022-02-24

## 2022-02-24 RX ORDER — PROCHLORPERAZINE EDISYLATE 5 MG/ML
5 INJECTION INTRAMUSCULAR; INTRAVENOUS ONCE AS NEEDED
Status: DISCONTINUED | OUTPATIENT
Start: 2022-02-24 | End: 2022-02-24

## 2022-02-24 RX ORDER — MORPHINE SULFATE 4 MG/ML
4 INJECTION, SOLUTION INTRAMUSCULAR; INTRAVENOUS EVERY 2 HOUR PRN
OUTPATIENT
Start: 2022-02-24 | End: 2022-02-25

## 2022-02-24 RX ORDER — MORPHINE SULFATE 15 MG/1
15 TABLET ORAL EVERY 4 HOURS PRN
OUTPATIENT
Start: 2022-02-24 | End: 2022-02-25

## 2022-02-24 RX ORDER — MORPHINE SULFATE 4 MG/ML
4 INJECTION, SOLUTION INTRAMUSCULAR; INTRAVENOUS EVERY 10 MIN PRN
Status: DISCONTINUED | OUTPATIENT
Start: 2022-02-24 | End: 2022-02-24

## 2022-02-24 RX ORDER — OXYCODONE HYDROCHLORIDE 5 MG/1
5 TABLET ORAL EVERY 4 HOURS PRN
OUTPATIENT
Start: 2022-02-24 | End: 2022-02-25

## 2022-02-24 RX ORDER — ONDANSETRON 2 MG/ML
4 INJECTION INTRAMUSCULAR; INTRAVENOUS ONCE AS NEEDED
Status: DISCONTINUED | OUTPATIENT
Start: 2022-02-24 | End: 2022-02-24

## 2022-02-24 RX ORDER — HALOPERIDOL 5 MG/ML
0.25 INJECTION INTRAMUSCULAR ONCE AS NEEDED
Status: DISCONTINUED | OUTPATIENT
Start: 2022-02-24 | End: 2022-02-24

## 2022-02-24 RX ORDER — HYDROMORPHONE HYDROCHLORIDE 1 MG/ML
0.4 INJECTION, SOLUTION INTRAMUSCULAR; INTRAVENOUS; SUBCUTANEOUS EVERY 5 MIN PRN
Status: DISCONTINUED | OUTPATIENT
Start: 2022-02-24 | End: 2022-02-24

## 2022-02-24 RX ORDER — GLYCOPYRROLATE 0.2 MG/ML
INJECTION, SOLUTION INTRAMUSCULAR; INTRAVENOUS AS NEEDED
Status: DISCONTINUED | OUTPATIENT
Start: 2022-02-24 | End: 2022-02-24 | Stop reason: SURG

## 2022-02-24 RX ORDER — LIDOCAINE HYDROCHLORIDE 10 MG/ML
INJECTION, SOLUTION EPIDURAL; INFILTRATION; INTRACAUDAL; PERINEURAL AS NEEDED
Status: DISCONTINUED | OUTPATIENT
Start: 2022-02-24 | End: 2022-02-24 | Stop reason: SURG

## 2022-02-24 RX ORDER — HYDROMORPHONE HYDROCHLORIDE 1 MG/ML
0.6 INJECTION, SOLUTION INTRAMUSCULAR; INTRAVENOUS; SUBCUTANEOUS EVERY 5 MIN PRN
Status: DISCONTINUED | OUTPATIENT
Start: 2022-02-24 | End: 2022-02-24

## 2022-02-24 RX ORDER — MORPHINE SULFATE 4 MG/ML
6 INJECTION, SOLUTION INTRAMUSCULAR; INTRAVENOUS EVERY 2 HOUR PRN
OUTPATIENT
Start: 2022-02-24 | End: 2022-02-25

## 2022-02-24 RX ORDER — CEFAZOLIN SODIUM/WATER 2 G/20 ML
2 SYRINGE (ML) INTRAVENOUS ONCE
Status: DISCONTINUED | OUTPATIENT
Start: 2022-02-24 | End: 2022-02-24 | Stop reason: HOSPADM

## 2022-02-24 RX ORDER — MIDAZOLAM HYDROCHLORIDE 1 MG/ML
INJECTION INTRAMUSCULAR; INTRAVENOUS
Status: COMPLETED | OUTPATIENT
Start: 2022-02-24 | End: 2022-02-24

## 2022-02-24 RX ORDER — MORPHINE SULFATE 4 MG/ML
2 INJECTION, SOLUTION INTRAMUSCULAR; INTRAVENOUS EVERY 2 HOUR PRN
OUTPATIENT
Start: 2022-02-24 | End: 2022-02-25

## 2022-02-24 RX ORDER — OXYCODONE HYDROCHLORIDE 5 MG/1
10 TABLET ORAL EVERY 4 HOURS PRN
OUTPATIENT
Start: 2022-02-24 | End: 2022-02-25

## 2022-02-24 RX ORDER — HYDROCODONE BITARTRATE AND ACETAMINOPHEN 5; 325 MG/1; MG/1
1 TABLET ORAL AS NEEDED
Status: DISCONTINUED | OUTPATIENT
Start: 2022-02-24 | End: 2022-02-24

## 2022-02-24 RX ORDER — LIDOCAINE HYDROCHLORIDE 10 MG/ML
INJECTION, SOLUTION INFILTRATION; PERINEURAL
Status: COMPLETED | OUTPATIENT
Start: 2022-02-24 | End: 2022-02-24

## 2022-02-24 RX ADMIN — ROPIVACAINE HYDROCHLORIDE 20 ML: 5 INJECTION, SOLUTION EPIDURAL; INFILTRATION; PERINEURAL at 17:05:00

## 2022-02-24 RX ADMIN — MIDAZOLAM HYDROCHLORIDE 2 MG: 1 INJECTION INTRAMUSCULAR; INTRAVENOUS at 17:05:00

## 2022-02-24 RX ADMIN — LIDOCAINE HYDROCHLORIDE 50 MG: 10 INJECTION, SOLUTION EPIDURAL; INFILTRATION; INTRACAUDAL; PERINEURAL at 17:40:00

## 2022-02-24 RX ADMIN — LIDOCAINE HYDROCHLORIDE 3 ML: 10 INJECTION, SOLUTION INFILTRATION; PERINEURAL at 18:00:00

## 2022-02-24 RX ADMIN — LIDOCAINE HYDROCHLORIDE 5 ML: 10 INJECTION, SOLUTION INFILTRATION; PERINEURAL at 17:05:00

## 2022-02-24 RX ADMIN — SODIUM CHLORIDE, SODIUM LACTATE, POTASSIUM CHLORIDE, CALCIUM CHLORIDE: 600; 310; 30; 20 INJECTION, SOLUTION INTRAVENOUS at 18:21:00

## 2022-02-24 RX ADMIN — DEXAMETHASONE SODIUM PHOSPHATE 4 MG: 4 MG/ML VIAL (ML) INJECTION at 17:44:00

## 2022-02-24 RX ADMIN — GLYCOPYRROLATE 0.2 MG: 0.2 INJECTION, SOLUTION INTRAMUSCULAR; INTRAVENOUS at 17:44:00

## 2022-02-24 RX ADMIN — CEFAZOLIN SODIUM/WATER 2 G: 2 G/20 ML SYRINGE (ML) INTRAVENOUS at 17:45:00

## 2022-02-24 NOTE — ANESTHESIA PROCEDURE NOTES
Airway  Date/Time: 2/24/2022 5:43 PM  Urgency: Elective    Airway not difficult    General Information and Staff    Patient location during procedure: OR  Anesthesiologist: Tonya Aguilar MD  Performed: anesthesiologist     Indications and Patient Condition  Indications for airway management: anesthesia  Spontaneous Ventilation: absent  Sedation level: deep  Preoxygenated: yes  Patient position: sniffing  Mask difficulty assessment: 0 - not attempted    Final Airway Details  Final airway type: supraglottic airway      Successful airway: classic  Size 4      Number of attempts at approach: 1  Number of other approaches attempted: 0

## 2022-02-24 NOTE — INTERVAL H&P NOTE
Pre-op Diagnosis: Closed bimalleolar fracture of right ankle, initial encounter [A18.074Y]    The above referenced H&P was reviewed by Pineda Acosta MD on 2/24/2022, the patient was examined and no significant changes have occurred in the patient's condition since the H&P was performed. I discussed with the patient and/or legal representative the potential benefits, risks and side effects of this procedure; the likelihood of the patient achieving goals; and potential problems that might occur during recuperation. I discussed reasonable alternatives to the procedure, including risks, benefits and side effects related to the alternatives and risks related to not receiving this procedure. We will proceed with procedure as planned.

## 2022-02-24 NOTE — ANESTHESIA PROCEDURE NOTES
Peripheral Block    Date/Time: 2/24/2022 5:05 PM  Performed by: Palak Woodward MD  Authorized by: Palak Woodward MD       General Information and Staff    Start Time:  2/24/2022 5:05 PM  End Time:  2/24/2022 5:09 PM  Anesthesiologist:  Palak Woodward MD  Performed by:   Anesthesiologist  Patient Location:  PACU      Site Identification: real time ultrasound guided and image stored and retrievable      Reason for Block: at surgeon's request and post-op pain management    Preanesthetic Checklist: 2 patient identifers, IV checked, risks and benefits discussed, monitors and equipment checked, pre-op evaluation, timeout performed, anesthesia consent and sterile technique used      Procedure Details    Patient Position:  Supine  Prep: ChloraPrep    Monitoring:  Cardiac monitor  Block Type:  Saphenous  Laterality:  Right  Injection Technique:  Single-shot    Needle    Needle Type:  Short-bevel and echogenic  Needle Gauge:  22 G  Needle Length:  100 mm  Needle Localization:  Ultrasound guidance  Reason for Ultrasound Use: appropriate spread of the medication was noted in real time and no ultrasound evidence of intravascular and/or intraneural injection            Assessment    Injection Assessment:  Good spread noted, incremental injection, low pressure, local visualized surrounding nerve on ultrasound, negative aspiration for heme and no pain on injection  Paresthesia Pain:  None  Heart Rate Change: No        Medications  2/24/2022 5:05 PM  Midazolam (VERSED)injection 2mg/2ml, 2 mg  lidocaine injection 1%, 5 mL  ropivacaine (NAROPIN) injection 0.5%, 20 mL    Additional Comments

## 2022-02-25 NOTE — ANESTHESIA PROCEDURE NOTES
Peripheral Block  Performed by: Joan Hopkins MD  Authorized by: Joan Hopkins MD       General Information and Staff    Start Time:  2/24/2022 5:10 PM  End Time:  2/24/2022 5:25 PM  Anesthesiologist:  Joan Hopkins MD  Performed by:   Anesthesiologist  Patient Location:  PACU    Block Placement: Pre Induction    Reason for Block: at surgeon's request and post-op pain management    Preanesthetic Checklist: 2 patient identifers, IV checked, site marked, risks and benefits discussed, monitors and equipment checked, pre-op evaluation, timeout performed, anesthesia consent, sterile technique used, no prohibitive neurological deficits and no local skin infection at insertion site      Procedure Details    Patient Position:  Supine  Prep: ChloraPrep    Monitoring:  Heart rate, cardiac monitor, continuous pulse ox and blood pressure cuff  Block Type:  Popliteal  Laterality:  Right  Injection Technique:  Single-shot    Needle    Needle Type:  Short-bevel and echogenic  Needle Gauge:  21 G  Needle Length:  100 mm  Needle Localization:  Ultrasound guidance  Reason for Ultrasound Use: appropriate spread of the medication was noted in real time and no ultrasound evidence of intravascular and/or intraneural injection            Assessment    Injection Assessment:  Good spread noted, incremental injection, local visualized surrounding nerve on ultrasound, low pressure, negative aspiration for heme, negative resistance and no pain on injection  Paresthesia Pain:  None      Medications    lidocaine injection 1%, 3 mL    Additional Comments

## 2022-02-25 NOTE — OPERATIVE REPORT
[facility] OPERATING ROOM  Operative Note     Micheal Johnson Location: OR   CSN [id number] MRN [id number]   Admission Date 2/24/2022 Operation Date 2/24/2022   Attending Physician Lin Zheng MD Operating Physician Stephenie Ghotra MD      Preoperative Diagnosis: Closed bimalleolar fracture of right ankle, initial encounter [S82.841A]     Postoperative Diagnosis: Closed bimalleolar fracture of right ankle, initial encounter [S82.841A]     Procedure Performed:   Right ankle lateral malleolus ORIF, fluoroscopy, short leg splint application     Primary Surgeon: Stephenie Ghotra MD      Assistant: Zach Swartz, [address] [facility]    Surgical assistant was required in this case for appropriate retraction, fracture reduction, maintenance of reduction, and implant placement. Surgical Findings: Right ankle displaced bimalleolar fracture. At the end of the case the ankle mortise was well reduced and the lateral malleolus was anatomically fixed with plate and screws in appropriate position. Anesthesia: General     Complications: None     Implants:   Implant Name Type Inv.  Item Serial No.  Lot No. LRB No. Used Action   6 hole tubular plate    [employer] N/A Right 1 Implanted   SCREW BN 3.5MM 28MM LOPRFL SCR - [id number]  SCREW BN 3.5MM 28MM LOPRFL SCR  [employer] N/A Right 1 Implanted   SCREW BN 3.5MM 16MM LOPRFL SCR - [id number]  SCREW BN 3.5MM 16MM LOPRFL SCR  [employer] N/A Right 1 Implanted   cancellous screw    [employer] N/A Right 2 Implanted   SCREW BN 3.5MM 14MM SS MARCO - [id number]  SCREW BN 3.5MM 14MM SS MARCO  [employer] N/A Right 1 Implanted   cortical screw    [employer] N/A Right 1 Implanted        Specimen: None     Drains: None     Condition: Stable     Estimated Blood Loss: Blood Output: 10 mL (2/24/2022  6:12 PM)      IVF: 900 mL     Tourniquet time:  35 minutes at 250 mmHg     Case in Detail: Patient sustained an isolated right ankle injury and x-rays confirmed an unstable fracture pattern with disruption of the ankle mortise. Therefore, I recommended surgery and discussed the risks, benefits and alternatives. The patient agreed to proceed with surgery. Written and verbal consent was given by the patient for the procedure. The patient was brought back to the operating room underwent general anesthesia and was positioned in the supine position with padding of all bony prominences. The patient was given IV antibiotics prior to making skin incisions. The ankle was prepped and draped in the usual fashion and a timeout was performed. The right ankle was confirmed as the correct procedure site and verified with the consent. The entire operative team agreed to proceed. An incision was made longitudinally over the distal fibula centered at the fracture site. Careful dissection was carried down to the fracture using tenotomy scissors creating full-thickness skin flaps. Consolidating hematoma was removed from the fracture site using a combination of irrigation and a small curette. The distal fibula fracture was reduced and provisionally held in place with a bone clamps and k-wires as needed. Fluoroscopy imaging was used to confirm anatomic fracture reduction as well as reduction of the ankle mortise. The fracture was then fixed using 3.5 mm cortex screws perpendicular to the fracture placed in a lag fashion by over drilling the near cortex and then drilling the far cortex and then placing bicortical screws. Clamp was removed and fracture was stable. Next, a one third tubular plate of appropriate length was placed on the lateral fibula spanning the fracture and secured with bicortical 3.5 mm cortex screws proximally and unicortical 4.0 mm cancellous screws distally. A cotton test was performed confirming that the syndesmosis was intact. There is a small avulsion fracture of the medial malleolus that will be treated nonoperatively.   The wound was irrigated copiously with normal saline and closed in a layered fashion with 2-0 Vicryl in the deep dermal layer and skin closure with staples. Soft dressing was applied and the ankle was placed into a well-padded short leg splint. The patient was awoken from anesthesia and extubated without complication. Disposition: To PACU in stable good condition     Postop plan: The patient will be discharged when discharge criteria is met and will be nonweightbearing on the operative lower extremity. Patient will be given appropriate pain management with instructions to ice and elevate the right lower extremity. Follow-up in 2 weeks for splint and staple removal and transition to a cam boot.         Anjel Landeros MD  2/24/2022  6:23 PM

## 2022-03-02 RX ORDER — HYDROCODONE BITARTRATE AND ACETAMINOPHEN 5; 325 MG/1; MG/1
1 TABLET ORAL EVERY 8 HOURS PRN
Qty: 28 TABLET | Refills: 0 | Status: SHIPPED | OUTPATIENT
Start: 2022-03-02

## 2022-03-14 ENCOUNTER — OFFICE VISIT (OUTPATIENT)
Dept: ORTHOPEDICS CLINIC | Facility: CLINIC | Age: 47
End: 2022-03-14
Payer: MEDICAID

## 2022-03-14 ENCOUNTER — HOSPITAL ENCOUNTER (OUTPATIENT)
Dept: GENERAL RADIOLOGY | Facility: HOSPITAL | Age: 47
Discharge: HOME | End: 2022-03-14
Attending: ORTHOPAEDIC SURGERY
Payer: MEDICAID

## 2022-03-14 DIAGNOSIS — Z47.89 ORTHOPEDIC AFTERCARE: ICD-10-CM

## 2022-03-14 DIAGNOSIS — S82.841A CLOSED BIMALLEOLAR FRACTURE OF RIGHT ANKLE, INITIAL ENCOUNTER: ICD-10-CM

## 2022-03-14 DIAGNOSIS — Z47.89 ORTHOPEDIC AFTERCARE: Primary | ICD-10-CM

## 2022-03-14 PROCEDURE — 73610 X-RAY EXAM OF ANKLE: CPT | Performed by: ORTHOPAEDIC SURGERY

## 2022-03-14 PROCEDURE — L4387 NON-PNEUM WALK BOOT PRE OTS: HCPCS | Performed by: ORTHOPAEDIC SURGERY

## 2022-03-14 PROCEDURE — L4361 PNEUMA/VAC WALK BOOT PRE OTS: HCPCS | Performed by: ORTHOPAEDIC SURGERY

## 2022-03-14 PROCEDURE — 99024 POSTOP FOLLOW-UP VISIT: CPT | Performed by: ORTHOPAEDIC SURGERY

## 2022-03-15 ENCOUNTER — HOSPITAL ENCOUNTER (OUTPATIENT)
Dept: CV DIAGNOSTICS | Facility: HOSPITAL | Age: 47
Discharge: HOME | End: 2022-03-15
Attending: INTERNAL MEDICINE
Payer: MEDICAID

## 2022-03-15 DIAGNOSIS — R07.9 CHEST PAIN, UNSPECIFIED TYPE: ICD-10-CM

## 2022-03-15 DIAGNOSIS — I49.3 PVC (PREMATURE VENTRICULAR CONTRACTION): ICD-10-CM

## 2022-03-15 DIAGNOSIS — R00.2 PALPITATIONS: ICD-10-CM

## 2022-03-15 PROCEDURE — 93243 EXT ECG>48HR<7D SCAN A/R: CPT | Performed by: INTERNAL MEDICINE

## 2022-03-15 PROCEDURE — 93242 EXT ECG>48HR<7D RECORDING: CPT | Performed by: INTERNAL MEDICINE

## 2022-03-15 PROCEDURE — 93244 EXT ECG>48HR<7D REV&INTERPJ: CPT | Performed by: INTERNAL MEDICINE

## 2022-03-15 PROCEDURE — 93306 TTE W/DOPPLER COMPLETE: CPT | Performed by: INTERNAL MEDICINE

## 2022-03-22 ENCOUNTER — TELEPHONE (OUTPATIENT)
Dept: PULMONOLOGY | Facility: CLINIC | Age: 47
End: 2022-03-22

## 2022-03-22 RX ORDER — BUDESONIDE AND FORMOTEROL FUMARATE DIHYDRATE 160; 4.5 UG/1; UG/1
2 AEROSOL RESPIRATORY (INHALATION) 2 TIMES DAILY
Qty: 1 EACH | Refills: 5 | Status: SHIPPED | OUTPATIENT
Start: 2022-03-22

## 2022-04-09 ENCOUNTER — HOSPITAL ENCOUNTER (OUTPATIENT)
Dept: CT IMAGING | Facility: HOSPITAL | Age: 47
Discharge: HOME | End: 2022-04-09
Attending: INTERNAL MEDICINE
Payer: MEDICAID

## 2022-04-09 DIAGNOSIS — R91.8 OPACITIES OF BOTH LUNGS PRESENT ON CHEST X-RAY: ICD-10-CM

## 2022-04-09 DIAGNOSIS — R91.1 LUNG NODULE: ICD-10-CM

## 2022-04-09 PROCEDURE — 71250 CT THORAX DX C-: CPT | Performed by: INTERNAL MEDICINE

## 2022-04-12 ENCOUNTER — OFFICE VISIT (OUTPATIENT)
Dept: ORTHOPEDICS CLINIC | Facility: CLINIC | Age: 47
End: 2022-04-12
Payer: MEDICAID

## 2022-04-12 ENCOUNTER — HOSPITAL ENCOUNTER (OUTPATIENT)
Dept: GENERAL RADIOLOGY | Facility: HOSPITAL | Age: 47
Discharge: HOME | End: 2022-04-12
Attending: ORTHOPAEDIC SURGERY
Payer: MEDICAID

## 2022-04-12 DIAGNOSIS — Z47.89 ORTHOPEDIC AFTERCARE: Primary | ICD-10-CM

## 2022-04-12 DIAGNOSIS — S82.841A CLOSED BIMALLEOLAR FRACTURE OF RIGHT ANKLE, INITIAL ENCOUNTER: ICD-10-CM

## 2022-04-12 DIAGNOSIS — Z47.89 ORTHOPEDIC AFTERCARE: ICD-10-CM

## 2022-04-12 PROCEDURE — 73610 X-RAY EXAM OF ANKLE: CPT | Performed by: ORTHOPAEDIC SURGERY

## 2022-04-12 PROCEDURE — 99024 POSTOP FOLLOW-UP VISIT: CPT | Performed by: ORTHOPAEDIC SURGERY

## 2022-04-14 DIAGNOSIS — R10.13 EPIGASTRIC PAIN: ICD-10-CM

## 2022-04-14 NOTE — TELEPHONE ENCOUNTER
Dr. Neelam Guevara to refill pantoprazole? (initially a trial period)     Patient states chest pains have improved ever since he started pantoprazole.

## 2022-04-14 NOTE — TELEPHONE ENCOUNTER
Per 2/17 Office Visit       6. Epigastric pain  -     Pantoprazole Sodium; Take 1 tablet (40 mg total) by mouth every morning before breakfast.  Dispense: 30 tablet; Refill: 1  Plan  His cardiac work-up about 5 years ago was overall unremarkable aside from some isolated PVCs, he does point to his epigastric region, we will check labs and imaging as above, he may benefit from stress testing but if necessary will defer to cardiology given his recent injury. I have asked him to trial a short course of pantoprazole to see if he has any improvement as his symptoms may be related to reflux. He will let me know how he feels over the next few weeks.

## 2022-04-15 RX ORDER — PANTOPRAZOLE SODIUM 40 MG/1
40 TABLET, DELAYED RELEASE ORAL
Qty: 90 TABLET | Refills: 1 | Status: SHIPPED | OUTPATIENT
Start: 2022-04-15 | End: 2022-06-20

## 2022-04-15 NOTE — TELEPHONE ENCOUNTER
1st attempt - [employer] message sent for patient to contact the office to schedule an appointment; see notes below.

## 2022-04-16 ENCOUNTER — PATIENT MESSAGE (OUTPATIENT)
Dept: PULMONOLOGY | Facility: CLINIC | Age: 47
End: 2022-04-16

## 2022-04-18 NOTE — TELEPHONE ENCOUNTER
From: Rolando Sanford  To: Verasophie EllingtonDO  Sent: 4/16/2022 1:32 PM CDT  Subject: Ct scan    This message is being sent by Katie Arnold on behalf of Rolando Sanford. Hi Dr. Quita Mckinney,  In regards to the recent ct scan, are you still leaning towards COPD for the diagnosis? I know the ct was stable but you mentioned you would know more about the diagnosis once these results were back.

## 2022-04-18 NOTE — TELEPHONE ENCOUNTER
Emiliano Soria pt wants to know if COPD is  the diagnosis you leaning towards in regards to the recent CT scan.

## 2022-04-21 ENCOUNTER — TELEPHONE (OUTPATIENT)
Dept: PHYSICAL THERAPY | Facility: HOSPITAL | Age: 47
End: 2022-04-21

## 2022-04-22 ENCOUNTER — OFFICE VISIT (OUTPATIENT)
Dept: PHYSICAL THERAPY | Facility: HOSPITAL | Age: 47
End: 2022-04-22
Attending: ORTHOPAEDIC SURGERY
Payer: MEDICAID

## 2022-04-22 DIAGNOSIS — S82.841A CLOSED BIMALLEOLAR FRACTURE OF RIGHT ANKLE, INITIAL ENCOUNTER: ICD-10-CM

## 2022-04-22 PROCEDURE — 97110 THERAPEUTIC EXERCISES: CPT

## 2022-04-22 PROCEDURE — 97161 PT EVAL LOW COMPLEX 20 MIN: CPT

## 2022-04-25 ENCOUNTER — OFFICE VISIT (OUTPATIENT)
Dept: PHYSICAL THERAPY | Facility: HOSPITAL | Age: 47
End: 2022-04-25
Attending: ORTHOPAEDIC SURGERY
Payer: MEDICAID

## 2022-04-25 PROCEDURE — 97140 MANUAL THERAPY 1/> REGIONS: CPT

## 2022-04-25 PROCEDURE — 97110 THERAPEUTIC EXERCISES: CPT

## 2022-04-25 NOTE — PROGRESS NOTES
Diagnosis:   Closed bimalleolar fracture of right ankle, initial encounter (U01.160D)         Referring Provider: Pilar Cordero  Date of Evaluation:    4/25/2022    Precautions:  WBAT with CAM walker until 5/10/2022 Next MD visit: 5/10/2022  Date of Surgery: 2/24/2022   Insurance (Authorized # of Visits):  15 - 60 units             Subjective: Reports R ankle was a little sore after last session, lasting only a day. Pain: 2/10 medial R ankle      Objective:     AROM: (* denotes performed with pain)  Foot/Ankle   DF: R 2; L 10  PF: R 30; L 50  Great toe ext: R 45; L 30     Accessory motion: restricted posterior TCJ glides       Assessment: Responded well to manual therapy and stretching with report of improved mobility and comfort. Would benefit from functional strengthening in boot to address effects of deconditioning. Goals: (to be met in 20 visits)  Short-term (4 wks, 8 visits)  Improve ankle mobility and flexibiliyt to at least 10 deg ankle DF. Improve foot and ankle strength at least 1/2 grade. Long-Term (10 wks, 20 visits)  Improve FOTO score by 38 pts to demonstrate functional improvement  Able to walk up to 45 min pain-free to be able to go grocery shopping. Able to negotiate stairs with reciprocal pattern pain-free for pain-free mobility at home. Able to tolerate at least 1 hr standing to be able to perform chores or prepare a meal at home  Be able to squat down with less difficulty and max 3/10 pain to retrieve objects off the ground at home. Plan: Continue manual therapy therapy to address jt and scar restrictions, restore ankle AROM/PROM, emphasize functional RLE strengthening. Date: 4/11/2022  TX#: 2/20 Date:                 TX#: 3/ Date:                 TX#: 4/ Date:                 TX#: 5/ Date:    Tx#: 6/   MT  -Posterior R talar glides gr 3-  -Gentle surgical scar mobilizations       EX  -PROM R ankle PF, INV, EVER  -Seated gastroc stretching 2x30 sec  -Shuttle ankle unloading DL squat x8 min 15 lbs  -seated heel raises x20 hold 5 sec  -Counter squats with boot 2x10  -Education on scar mobilization       HEP - added counter squats in boot                  Charges: MT, EX 2       Total Timed Treatment: MT 15 min, EX 23 min  Total Treatment Time: 38 min

## 2022-04-27 ENCOUNTER — OFFICE VISIT (OUTPATIENT)
Dept: PHYSICAL THERAPY | Facility: HOSPITAL | Age: 47
End: 2022-04-27
Attending: ORTHOPAEDIC SURGERY
Payer: MEDICAID

## 2022-04-27 PROCEDURE — 97110 THERAPEUTIC EXERCISES: CPT

## 2022-04-27 PROCEDURE — 97140 MANUAL THERAPY 1/> REGIONS: CPT

## 2022-04-27 NOTE — PROGRESS NOTES
Diagnosis:   Closed bimalleolar fracture of right ankle, initial encounter (E56.672J)         Referring Provider: Vernon Adrian  Date of Evaluation:    4/25/2022    Precautions:  WBAT with CAM walker until 5/10/2022 Next MD visit: 5/10/2022  Date of Surgery: 2/24/2022   Insurance (Authorized # of Visits):  15 - 60 units             Subjective: Patient reports that right ankle doing good not much pain feel more soreness. Pain: 1/10 medial R ankle      Objective: Patient arrived with right walking boot     AROM: (* denotes performed with pain)  Foot/Ankle   DF: R 2; L 10  PF: R 30; L 50  Great toe ext: R 45; L 30     Accessory motion: restricted posterior TCJ glides       Assessment: Focus on right ankle manual therapy, stretching and gentle strengthening,  Would benefit from functional strengthening in boot to address effects of deconditioning. Goals: (to be met in 20 visits)  Short-term (4 wks, 8 visits)  Improve ankle mobility and flexibiliyt to at least 10 deg ankle DF. Improve foot and ankle strength at least 1/2 grade. Long-Term (10 wks, 20 visits)  Improve FOTO score by 38 pts to demonstrate functional improvement  Able to walk up to 45 min pain-free to be able to go grocery shopping. Able to negotiate stairs with reciprocal pattern pain-free for pain-free mobility at home. Able to tolerate at least 1 hr standing to be able to perform chores or prepare a meal at home  Be able to squat down with less difficulty and max 3/10 pain to retrieve objects off the ground at home. Plan: Continue manual therapy therapy to address jt and scar restrictions, restore ankle AROM/PROM, emphasize functional RLE strengthening. Date: 4/11/2022  TX#: 2/20 Date:  4/27/2022               TX#: 3/20 Date:                 TX#: 4/ Date:                 TX#: 5/ Date:    Tx#: 6/   MT  -Posterior R talar glides gr 3-  -Gentle surgical scar mobilizations MT  -Posterior R talar glides gr 3-  -Gentle surgical scar mobilizations EX  -PROM R ankle PF, INV, EVER  -Seated gastroc stretching 2x30 sec  -Shuttle ankle unloading DL squat x8 min 15 lbs  -seated heel raises x20 hold 5 sec  -Counter squats with boot 2x10  -Education on scar mobilization EX  -PROM R ankle PF, INV, EVER  -Long sitting gastroc stretching 2x30 sec  -Long sitting knee flexion with DF x30  -Seated heel raises 20x5\"  -Seated CW/CCW x10 ea  -Seated alphabet x1  -Seated crunch toe x20  -Counter squats with boots x20  -Standing hip abd x20 R  -Standing marching x20  -Shuttle ankle unloading DL squat x8 min 17 lbs        HEP - added counter squats in boot HEP: Review                 Charges: MT, EX 2       Total Timed Treatment: 42 min  Total Treatment Time: 42 min

## 2022-05-04 ENCOUNTER — OFFICE VISIT (OUTPATIENT)
Dept: PHYSICAL THERAPY | Facility: HOSPITAL | Age: 47
End: 2022-05-04
Attending: ORTHOPAEDIC SURGERY
Payer: MEDICAID

## 2022-05-04 PROCEDURE — 97110 THERAPEUTIC EXERCISES: CPT

## 2022-05-04 PROCEDURE — 97140 MANUAL THERAPY 1/> REGIONS: CPT

## 2022-05-04 NOTE — PROGRESS NOTES
Diagnosis:   Closed bimalleolar fracture of right ankle, initial encounter (B83.718R)         Referring Provider: Payton Peter  Date of Evaluation:    4/25/2022    Precautions:  WBAT with CAM walker until 5/10/2022 Next MD visit: 5/10/2022  Date of Surgery: 2/24/2022   Insurance (Authorized # of Visits):  15 - 60 units             Subjective: Patient reports that right ankle doing good not much pain feel more stiffness. Pain: 1-2/10 medial R ankle on/off      Objective: Patient arrived with right walking boot     AROM: (* denotes performed with pain)  Foot/Ankle   DF: R 2; L 10  PF: R 30; L 50  Great toe ext: R 45; L 30     Accessory motion: restricted posterior TCJ glides       Assessment: Focus on right ankle manual therapy, stretching and gentle strengthening, added resisted TB ankle ROM exercises to improved functional mobility and ankle strength. Goals: (to be met in 20 visits)  Short-term (4 wks, 8 visits)  Improve ankle mobility and flexibiliyt to at least 10 deg ankle DF. Improve foot and ankle strength at least 1/2 grade. Long-Term (10 wks, 20 visits)  Improve FOTO score by 38 pts to demonstrate functional improvement  Able to walk up to 45 min pain-free to be able to go grocery shopping. Able to negotiate stairs with reciprocal pattern pain-free for pain-free mobility at home. Able to tolerate at least 1 hr standing to be able to perform chores or prepare a meal at home  Be able to squat down with less difficulty and max 3/10 pain to retrieve objects off the ground at home. Plan: Continue manual therapy therapy to address jt and scar restrictions, restore ankle AROM/PROM, emphasize functional RLE strengthening. Date: 4/11/2022  TX#: 2/20 Date:  4/27/2022               TX#: 3/20 Date:  5/4/2022               TX#: 4/20 Date:                 TX#: 5/ Date:    Tx#: 6/   MT  -Posterior R talar glides gr 3-  -Gentle surgical scar mobilizations MT  -Posterior R talar glides gr 3-  -Gentle surgical scar mobilizations MT  -Posterior R talar glides gr 3-  -Gentle surgical scar mobilizations     EX  -PROM R ankle PF, INV, EVER  -Seated gastroc stretching 2x30 sec  -Shuttle ankle unloading DL squat x8 min 15 lbs  -seated heel raises x20 hold 5 sec  -Counter squats with boot 2x10  -Education on scar mobilization EX  -PROM R ankle PF, INV, EVER  -Long sitting gastroc stretching 2x30 sec  -Long sitting knee flexion with DF x30  -Seated heel raises 20x5\"  -Seated CW/CCW x10 ea  -Seated alphabet x1  -Seated crunch toe x20  -Counter squats with boots x20  -Standing hip abd x20 R  -Standing marching x20  -Shuttle ankle unloading DL squat x8 min 17 lbs   EX  -PROM R ankle PF, INV, EVER  -Long sitting gastroc stretching 4x30 sec  -Long sitting knee flexion with DF x30  -Long sitting DF/PF INV/ENV with YTB x20 ea  -Seated heel raises 20x5\"  -Seated CW/CCW x10 ea  -Seated alphabet x1  -Seated crunch toe x20  -Counter squats with boots x20  -Standing hip abd x20 R  -Standing marching x20  -Shuttle ankle unloading DL squat x8 min 25 lbs     HEP - added counter squats in boot HEP: Review HEP: Review                Charges: MT, EX 2       Total Timed Treatment: 42 min  Total Treatment Time: 42 min

## 2022-05-06 ENCOUNTER — OFFICE VISIT (OUTPATIENT)
Dept: PHYSICAL THERAPY | Facility: HOSPITAL | Age: 47
End: 2022-05-06
Attending: ORTHOPAEDIC SURGERY
Payer: MEDICAID

## 2022-05-06 PROCEDURE — 97110 THERAPEUTIC EXERCISES: CPT

## 2022-05-06 PROCEDURE — 97140 MANUAL THERAPY 1/> REGIONS: CPT

## 2022-05-06 NOTE — PROGRESS NOTES
Diagnosis:   Closed bimalleolar fracture of right ankle, initial encounter (Z10.581U)         Referring Provider: Leonardo Clements  Date of Evaluation:    4/25/2022    Precautions:  WBAT with CAM walker until 5/10/2022 Next MD visit: 5/10/2022  Date of Surgery: 2/24/2022   Insurance (Authorized # of Visits):  15 - 60 units             Subjective: Patient reports that right ankle doing good no pain, yesterday went for shopping and go four stores and feel ok no problem, walking and stair climbing ok. Pain: 1/10 medial R ankle on/off      Objective: Patient arrived with right walking boot     AROM: (* denotes performed with pain)  Foot/Ankle   DF: R 2; L 10  PF: R 30; L 50  Great toe ext: R 45; L 30     Accessory motion: restricted posterior TCJ glides       Assessment: Focus on right ankle manual therapy, stretching and gentle strengthening, added resisted TB ankle ROM exercises. Patient scar heeling well, slight swollen noted around incision. No c/o pain or discomfort post session. Goals: (to be met in 20 visits)  Short-term (4 wks, 8 visits)  Improve ankle mobility and flexibiliyt to at least 10 deg ankle DF. Improve foot and ankle strength at least 1/2 grade. Long-Term (10 wks, 20 visits)  Improve FOTO score by 38 pts to demonstrate functional improvement  Able to walk up to 45 min pain-free to be able to go grocery shopping. Able to negotiate stairs with reciprocal pattern pain-free for pain-free mobility at home. Able to tolerate at least 1 hr standing to be able to perform chores or prepare a meal at home  Be able to squat down with less difficulty and max 3/10 pain to retrieve objects off the ground at home. Plan: Continue manual therapy therapy to address jt and scar restrictions, restore ankle AROM/PROM, emphasize functional RLE strengthening.     Date: 4/11/2022  TX#: 2/20 Date:  4/27/2022               TX#: 3/20 Date:  5/4/2022               TX#: 4/20 Date:  5/6/2022               TX#: 5/20 Date: Tx#: 6/   MT  -Posterior R talar glides gr 3-  -Gentle surgical scar mobilizations MT  -Posterior R talar glides gr 3-  -Gentle surgical scar mobilizations MT  -Posterior R talar glides gr 3-  -Gentle surgical scar mobilizations MT  -Posterior R talar glides gr 3-  -Gentle surgical scar mobilizations    EX  -PROM R ankle PF, INV, EVER  -Seated gastroc stretching 2x30 sec  -Shuttle ankle unloading DL squat x8 min 15 lbs  -seated heel raises x20 hold 5 sec  -Counter squats with boot 2x10  -Education on scar mobilization EX  -PROM R ankle PF, INV, EVER  -Long sitting gastroc stretching 2x30 sec  -Long sitting knee flexion with DF x30  -Seated heel raises 20x5\"  -Seated CW/CCW x10 ea  -Seated alphabet x1  -Seated crunch toe x20  -Counter squats with boots x20  -Standing hip abd x20 R  -Standing marching x20  -Shuttle ankle unloading DL squat x8 min 17 lbs   EX  -PROM R ankle PF, INV, EVER  -Long sitting gastroc stretching 4x30 sec  -Long sitting knee flexion with DF x30  -Long sitting DF/PF INV/ENV with YTB x20 ea  -Seated heel raises 20x5\"  -Seated CW/CCW x10 ea  -Seated alphabet x1  -Seated crunch toe x20  -Counter squats with boots x20  -Standing hip abd x20 R  -Standing marching x20  -Shuttle ankle unloading DL squat x8 min 25 lbs EX  NOTE: All standing activity on # bars with right walking boot  -Airex marching with boot x20  -Airex squats x20  -Airex SL hip abd/ext and flex x20 ea  -8 inch fwd/lat step down x20 ea R  -PROM R ankle PF, INV, EVER  -Long sitting gastroc stretching 4x30 sec  -Long sitting knee flexion with DF x30  -Long sitting DF/PF INV/ENV with GTB x20 ea  -Long sitting PF with Blue TB x20  -Seated heel/toe raises 20x5\"  -Seated CW/CCW x10 ea  -Seated alphabet x1  -Seated crunch toe x20  -Shuttle ankle unloading DL squat x8 min 25 lbs    HEP - added counter squats in boot HEP: Review HEP: Review HEP: Verbally heel/toe raises, CW/CCW, toe crunch, Long sitting calf stretch with towel Charges: MT, EX 2       Total Timed Treatment: 50 min  Total Treatment Time: 50 min

## 2022-05-09 ENCOUNTER — OFFICE VISIT (OUTPATIENT)
Dept: PHYSICAL THERAPY | Facility: HOSPITAL | Age: 47
End: 2022-05-09
Attending: ORTHOPAEDIC SURGERY
Payer: MEDICAID

## 2022-05-09 PROCEDURE — 97140 MANUAL THERAPY 1/> REGIONS: CPT

## 2022-05-09 PROCEDURE — 97110 THERAPEUTIC EXERCISES: CPT

## 2022-05-09 NOTE — PROGRESS NOTES
Diagnosis:   Closed bimalleolar fracture of right ankle, initial encounter (C98.455O)         Referring Provider: Lolly Estrada  Date of Evaluation:    4/25/2022    Precautions:  WBAT with CAM walker until 5/10/2022 Next MD visit: 5/10/2022  Date of Surgery: 2/24/2022   Insurance (Authorized # of Visits):  15 - 60 units  ([address] [place] [address])    Subjective: Reports pain is minimal. Ready to get out of the boot. MD follow up tomorrow. Pain: 1/10 medial R ankle on/off      Objective: Patient arrived with right walking boot     AROM: (* denotes performed with pain)  Foot/Ankle   DF: R 0 improved tow 5 deg with OP; L 10  PF: R 30 improved to 40; L 50     Accessory motion: restricted posterior TCJ glides       Assessment: Eccentric quad weakness noted with SL squatting. Difficulty controlling proximal hip limiting stability with balance activities. TCJ remains very stiff but responds well to mobilizations and stretching. Goals: (to be met in 20 visits)  Short-term (4 wks, 8 visits)  Improve ankle mobility and flexibiliyt to at least 10 deg ankle DF. Improve foot and ankle strength at least 1/2 grade. Long-Term (10 wks, 20 visits)  Improve FOTO score by 38 pts to demonstrate functional improvement  Able to walk up to 45 min pain-free to be able to go grocery shopping. Able to negotiate stairs with reciprocal pattern pain-free for pain-free mobility at home. Able to tolerate at least 1 hr standing to be able to perform chores or prepare a meal at home  Be able to squat down with less difficulty and max 3/10 pain to retrieve objects off the ground at home. Plan: Continue manual therapy therapy to address jt and scar restrictions, restore ankle AROM/PROM, emphasize functional RLE strengthening.     Date: 4/11/2022  TX#: 2/20 Date:  4/27/2022               TX#: 3/20 Date:  5/4/2022               TX#: 4/20 Date:  5/6/2022               TX#: 5/20 Date: 5/9/2022  Tx#: 6/ (15 auth)   MT  -Posterior R talar glides gr 3-  -Gentle surgical scar mobilizations MT  -Posterior R talar glides gr 3-  -Gentle surgical scar mobilizations MT  -Posterior R talar glides gr 3-  -Gentle surgical scar mobilizations MT  -Posterior R talar glides gr 3-  -Gentle surgical scar mobilizations MT  -Posterior R talar glides gr 3-  -Gentle surgical scar mobilizations  -R STJ rocking   EX  -PROM R ankle PF, INV, EVER  -Seated gastroc stretching 2x30 sec  -Shuttle ankle unloading DL squat x8 min 15 lbs  -seated heel raises x20 hold 5 sec  -Counter squats with boot 2x10  -Education on scar mobilization EX  -PROM R ankle PF, INV, EVER  -Long sitting gastroc stretching 2x30 sec  -Long sitting knee flexion with DF x30  -Seated heel raises 20x5\"  -Seated CW/CCW x10 ea  -Seated alphabet x1  -Seated crunch toe x20  -Counter squats with boots x20  -Standing hip abd x20 R  -Standing marching x20  -Shuttle ankle unloading DL squat x8 min 17 lbs   EX  -PROM R ankle PF, INV, EVER  -Long sitting gastroc stretching 4x30 sec  -Long sitting knee flexion with DF x30  -Long sitting DF/PF INV/ENV with YTB x20 ea  -Seated heel raises 20x5\"  -Seated CW/CCW x10 ea  -Seated alphabet x1  -Seated crunch toe x20  -Counter squats with boots x20  -Standing hip abd x20 R  -Standing marching x20  -Shuttle ankle unloading DL squat x8 min 25 lbs EX  NOTE: All standing activity on # bars with right walking boot  -Airex marching with boot x20  -Airex squats x20  -Airex SL hip abd/ext and flex x20 ea  -8 inch fwd/lat step down x20 ea R  -PROM R ankle PF, INV, EVER  -Long sitting gastroc stretching 4x30 sec  -Long sitting knee flexion with DF x30  -Long sitting DF/PF INV/ENV with GTB x20 ea  -Long sitting PF with Blue TB x20  -Seated heel/toe raises 20x5\"  -Seated CW/CCW x10 ea  -Seated alphabet x1  -Seated crunch toe x20  -Shuttle ankle unloading DL squat x8 min 25 lbs EX  -PROM R ankle PF, INV, EVER  -Seated gastroc stretching 2x30 sec  -Shuttle ankle unloading DL squat x5 min 25 lbs    NOTE: All standing activity on # bars with right walking boot  -Standing hip abd GTB Tubing 3x10 bilat  -SLS balance 4x30 sec bilat  -SL Squatting paralell bars 3x10 bilat   HEP - added counter squats in boot HEP: Review HEP: Review HEP: Verbally heel/toe raises, CW/CCW, toe crunch, Long sitting calf stretch with towel               Charges: MT, EX 3       Total Timed Treatment: MT 10 min, EX 38 min  Total Treatment Time: 48 min

## 2022-05-10 ENCOUNTER — OFFICE VISIT (OUTPATIENT)
Dept: ORTHOPEDICS CLINIC | Facility: CLINIC | Age: 47
End: 2022-05-10
Payer: MEDICAID

## 2022-05-10 ENCOUNTER — HOSPITAL ENCOUNTER (OUTPATIENT)
Dept: GENERAL RADIOLOGY | Facility: HOSPITAL | Age: 47
Discharge: HOME | End: 2022-05-10
Attending: ORTHOPAEDIC SURGERY
Payer: MEDICAID

## 2022-05-10 DIAGNOSIS — Z47.89 ORTHOPEDIC AFTERCARE: Primary | ICD-10-CM

## 2022-05-10 DIAGNOSIS — Z47.89 ORTHOPEDIC AFTERCARE: ICD-10-CM

## 2022-05-10 PROCEDURE — 73610 X-RAY EXAM OF ANKLE: CPT | Performed by: ORTHOPAEDIC SURGERY

## 2022-05-10 PROCEDURE — 99024 POSTOP FOLLOW-UP VISIT: CPT | Performed by: ORTHOPAEDIC SURGERY

## 2022-05-12 ENCOUNTER — OFFICE VISIT (OUTPATIENT)
Dept: PHYSICAL THERAPY | Facility: HOSPITAL | Age: 47
End: 2022-05-12
Attending: ORTHOPAEDIC SURGERY
Payer: MEDICAID

## 2022-05-12 PROCEDURE — 97110 THERAPEUTIC EXERCISES: CPT

## 2022-05-12 PROCEDURE — 97140 MANUAL THERAPY 1/> REGIONS: CPT

## 2022-05-12 NOTE — PROGRESS NOTES
Diagnosis:   Closed bimalleolar fracture of right ankle, initial encounter (K36.902X)         Referring Provider: Raven Holland  Date of Evaluation:    4/25/2022    Precautions:  WBAT with CAM walker until 5/10/2022 Next MD visit: 5/10/2022  Date of Surgery: 2/24/2022   Insurance (Authorized # of Visits):  15 - 60 units  ([address] [place] [address])    Subjective: Patient reports that he is doing well left calf feel tightness, right ankle slight pain but ok walking with gym shoes pain is minimal. Show Dr Whaley and he discontinue cam boot and transition into normal shoes with good support and he told if ankle lace up brace as needed. Everything look good and heeling well. Pain: 1/10 medial R ankle on/off      Objective: Patient arrived with right walking boot     AROM: (* denotes performed with pain)  Foot/Ankle   DF: R 0 improved tow 5 deg with OP; L 10  PF: R 30 improved to 40; L 50     Accessory motion: restricted posterior TCJ glides       Assessment: Eccentric quad weakness noted with squatting. Patient c/o tight and stiff both calf and right achillis, added standing calf stretch, heel raises and squats, patient c/o slight right calf tired and fatigued no c/o increased pain or discomfort. Will progress HEP next session. Goals: (to be met in 20 visits)  Short-term (4 wks, 8 visits)  Improve ankle mobility and flexibiliyt to at least 10 deg ankle DF. Improve foot and ankle strength at least 1/2 grade. Long-Term (10 wks, 20 visits)  Improve FOTO score by 38 pts to demonstrate functional improvement  Able to walk up to 45 min pain-free to be able to go grocery shopping. Able to negotiate stairs with reciprocal pattern pain-free for pain-free mobility at home. Able to tolerate at least 1 hr standing to be able to perform chores or prepare a meal at home  Be able to squat down with less difficulty and max 3/10 pain to retrieve objects off the ground at home.     Plan: Continue manual therapy therapy to address jt and scar restrictions, restore ankle AROM/PROM, emphasize functional RLE strengthening.     Date:  5/4/2022               TX#: 4/20 Date:  5/6/2022               TX#: 5/20 Date: 5/9/2022  Tx#: 6/ (15 auth) Date: 5/12/2022  Tx#: 7/15   MT  -Posterior R talar glides gr 3-  -Gentle surgical scar mobilizations MT  -Posterior R talar glides gr 3-  -Gentle surgical scar mobilizations MT  -Posterior R talar glides gr 3-  -Gentle surgical scar mobilizations  -R STJ rocking MT  -Posterior R talar glides gr 3-  -Gentle surgical scar mobilizations   EX  -PROM R ankle PF, INV, EVER  -Long sitting gastroc stretching 4x30 sec  -Long sitting knee flexion with DF x30  -Long sitting DF/PF INV/ENV with YTB x20 ea  -Seated heel raises 20x5\"  -Seated CW/CCW x10 ea  -Seated alphabet x1  -Seated crunch toe x20  -Counter squats with boots x20  -Standing hip abd x20 R  -Standing marching x20  -Shuttle ankle unloading DL squat x8 min 25 lbs EX  NOTE: All standing activity on # bars with right walking boot  -Airex marching with boot x20  -Airex squats x20  -Airex SL hip abd/ext and flex x20 ea  -8 inch fwd/lat step down x20 ea R  -PROM R ankle PF, INV, EVER  -Long sitting gastroc stretching 4x30 sec  -Long sitting knee flexion with DF x30  -Long sitting DF/PF INV/ENV with GTB x20 ea  -Long sitting PF with Blue TB x20  -Seated heel/toe raises 20x5\"  -Seated CW/CCW x10 ea  -Seated alphabet x1  -Seated crunch toe x20  -Shuttle ankle unloading DL squat x8 min 25 lbs EX  -PROM R ankle PF, INV, EVER  -Seated gastroc stretching 2x30 sec  -Shuttle ankle unloading DL squat x5 min 25 lbs    NOTE: All standing activity on # bars with right walking boot  -Standing hip abd GTB Tubing 3x10 bilat  -SLS balance 4x30 sec bilat  -SL Squatting paralell bars 3x10 bilat EX  -Nu step L5 x8 min  -Shuttle ankle unloading DL squat x5 min 37 lbs  -PROM R ankle PF, INV, EVER  -Long sitting PROM R ankle PF, INV, EVER x20 ea  -Long sitting calf stretch 5 x30 sec  -Standing heel raises x20  -Standing calf stretch 4 x20 sec hold  -Chair squats x20  -Sit<>stand from chair x15     HEP: Review HEP: Verbally heel/toe raises, CW/CCW, toe crunch, Long sitting calf stretch with towel               Charges: MT, EX 2      Total Timed Treatment: 45 min  Total Treatment Time: 48 min

## 2022-05-18 ENCOUNTER — OFFICE VISIT (OUTPATIENT)
Dept: PHYSICAL THERAPY | Facility: HOSPITAL | Age: 47
End: 2022-05-18
Attending: ORTHOPAEDIC SURGERY
Payer: MEDICAID

## 2022-05-18 PROCEDURE — 97110 THERAPEUTIC EXERCISES: CPT

## 2022-05-18 PROCEDURE — 97140 MANUAL THERAPY 1/> REGIONS: CPT

## 2022-05-18 NOTE — PROGRESS NOTES
Diagnosis:   Closed bimalleolar fracture of right ankle, initial encounter (G63.134Y)         Referring Provider: Pleasant Essex  Date of Evaluation:    4/25/2022    Precautions:  WBAT with CAM walker until 5/10/2022 Next MD visit: 5/10/2022  Date of Surgery: 2/24/2022   Insurance (Authorized # of Visits):  15 - 60 units  ([address] [place] [address])    Subjective: Patient reports that he is doing well left calf feel tightness, over the weekend Saturday and Sunday lot feel tired and fatigued no pain. Overall Saturday standing almost 5 hrs no pain. Sunday stand and walk about 4 hrs,  Walk up/down the hill with no problem walk slow. Pain: 1/10 medial R ankle on/off      Objective: Patient arrived with right walking boot     AROM: (* denotes performed with pain)  Foot/Ankle Foot/Ankle 5/20/22   DF: R 0 improved tow 5 deg with OP; L 10  PF: R 30 improved to 40; L 50 DF:  PF:  IV:  EV:       Accessory motion: restricted posterior TCJ glides       Assessment: Eccentric quad weakness noted with squatting. Decreased swollen. Patient continued  c/o tight and stiff both calf and right achillis, added standing calf stretch, heel raises and squats, patient c/o slight right calf tired and fatigued no c/o increased pain or discomfort. Will progress HEP next session. Goals: (to be met in 20 visits)  Short-term (4 wks, 8 visits)  Improve ankle mobility and flexibiliyt to at least 10 deg ankle DF.   Improve foot and ankle strength at least 1/2 grade.-Partially Met    Long-Term (10 wks, 20 visits)  Improve FOTO score by 38 pts to demonstrate functional improvement  Able to walk up to 45 min pain-free to be able to go grocery shopping.-Partially Met  Able to negotiate stairs with reciprocal pattern pain-free for pain-free mobility at home.-In progressed  Able to tolerate at least 1 hr standing to be able to perform chores or prepare a meal at home-In Progressed  Be able to squat down with less difficulty and max 3/10 pain to retrieve objects off the ground at home.-Partially Met    Plan: Continue manual therapy therapy to address jt and scar restrictions, restore ankle AROM/PROM, emphasize functional RLE strengthening.     Date:  5/6/2022               TX#: 5/20 Date: 5/9/2022  Tx#: 6/ (15 auth) Date: 5/12/2022  Tx#: 7/15 Date: 5/18/2022  Tx#: 8/15   MT  -Posterior R talar glides gr 3-  -Gentle surgical scar mobilizations MT  -Posterior R talar glides gr 3-  -Gentle surgical scar mobilizations  -R STJ rocking MT  -Posterior R talar glides gr 3-  -Gentle surgical scar mobilizations MT  -Posterior R talar glides gr 3-  -Gentle surgical scar mobilizations   EX  NOTE: All standing activity on # [employer] with right walking boot  -Airex marching with boot x20  -Airex squats x20  -Airex SL hip abd/ext and flex x20 ea  -8 inch fwd/lat step down x20 ea R  -PROM R ankle PF, INV, EVER  -Long sitting gastroc stretching 4x30 sec  -Long sitting knee flexion with DF x30  -Long sitting DF/PF INV/ENV with GTB x20 ea  -Long sitting PF with Blue TB x20  -Seated heel/toe raises 20x5\"  -Seated CW/CCW x10 ea  -Seated alphabet x1  -Seated crunch toe x20  -Shuttle ankle unloading DL squat x8 min 25 lbs EX  -PROM R ankle PF, INV, EVER  -Seated gastroc stretching 2x30 sec  -Shuttle ankle unloading DL squat x5 min 25 lbs    NOTE: All standing activity on # [employer] with right walking boot  -Standing hip abd GTB Tubing 3x10 bilat  -SLS balance 4x30 sec bilat  -SL Squatting paralell bars 3x10 bilat EX  -Nu step L5 x8 min  -Shuttle ankle unloading DL squat x5 min 37 lbs  -PROM R ankle PF, INV, EVER  -Long sitting PROM R ankle PF, INV, EVER x20 ea  -Long sitting calf stretch 5 x30 sec  -Standing heel raises x20  -Standing calf stretch 4 x20 sec hold  -Chair squats x20  -Sit<>stand from chair x15   EX  -Shuttle ankle unloading DL squat x5 min 37 lbs  -PROM R ankle PF, INV, EVER  -Long sitting AROM R ankle PF, INV, EVER x20 ea  -Long sitting calf stretch 5 x30 sec  -Standing heel raises x20  -Standing calf stretch 4 x20 sec hold  -Chair squats x20  -Sit<>stand from chair x15   HEP: Verbally heel/toe raises, CW/CCW, toe crunch, Long sitting calf stretch with towel   HEP:  Verbally: Standing heel raises, calf stretch              Charges: MT, EX 2      Total Timed Treatment: 45 min  Total Treatment Time: 48 min

## 2022-05-20 ENCOUNTER — OFFICE VISIT (OUTPATIENT)
Dept: PHYSICAL THERAPY | Facility: HOSPITAL | Age: 47
End: 2022-05-20
Attending: ORTHOPAEDIC SURGERY
Payer: MEDICAID

## 2022-05-20 PROCEDURE — 97110 THERAPEUTIC EXERCISES: CPT

## 2022-05-20 NOTE — PROGRESS NOTES
Diagnosis:   Closed bimalleolar fracture of right ankle, initial encounter (V86.992F)         Referring Provider: Lynette Staley  Date of Evaluation:    4/25/2022    Precautions:  WBAT with CAM walker until 5/10/2022 Next MD visit: 5/10/2022  Date of Surgery: 2/24/2022   Insurance (Authorized # of Visits):  15 - 60 units  ([address] [place] [address])    Subjective: Patient reports that he is doing well left calf feel tightness, over the weekend Saturday and Sunday lot feel tired and fatigued no pain. Overall Saturday standing almost 5 hrs no pain. Sunday stand and walk about 4 hrs,  Walk up/down the hill with no problem walk slow. Pain: 1/10 medial R ankle on/off      Objective: Patient arrived with right walking boot     AROM: (* denotes performed with pain)  Foot/Ankle Foot/Ankle 5/20/22   DF: R 0 improved tow 5 deg with OP; L 10  PF: R 30 improved to 40; L 50 DF:  PF:  IV:  EV:       Accessory motion: restricted posterior TCJ glides       Assessment: Eccentric quad weakness noted with squatting. Decreased swollen. Patient continued  c/o tight and stiff both calf and right achillis, added standing calf stretch, heel raises and squats, patient c/o slight right calf tired and fatigued no c/o increased pain or discomfort. Will progress HEP next session. Goals: (to be met in 20 visits)  Short-term (4 wks, 8 visits)  Improve ankle mobility and flexibiliyt to at least 10 deg ankle DF.   Improve foot and ankle strength at least 1/2 grade.-Partially Met    Long-Term (10 wks, 20 visits)  Improve FOTO score by 38 pts to demonstrate functional improvement  Able to walk up to 45 min pain-free to be able to go grocery shopping.-Partially Met  Able to negotiate stairs with reciprocal pattern pain-free for pain-free mobility at home.-In progressed  Able to tolerate at least 1 hr standing to be able to perform chores or prepare a meal at home-In Progressed  Be able to squat down with less difficulty and max 3/10 pain to retrieve objects off the ground at home.-Partially Met    Plan: Continue manual therapy therapy to address jt and scar restrictions, restore ankle AROM/PROM, emphasize functional RLE strengthening.     Date:  5/6/2022               TX#: 5/20 Date: 5/9/2022  Tx#: 6/ (15 auth) Date: 5/12/2022  Tx#: 7/15 Date: 5/18/2022  Tx#: 8/15   MT  -Posterior R talar glides gr 3-  -Gentle surgical scar mobilizations MT  -Posterior R talar glides gr 3-  -Gentle surgical scar mobilizations  -R STJ rocking MT  -Posterior R talar glides gr 3-  -Gentle surgical scar mobilizations MT  -Posterior R talar glides gr 3-  -Gentle surgical scar mobilizations   EX  NOTE: All standing activity on # [place] with right walking boot  -Airex marching with boot x20  -Airex squats x20  -Airex SL hip abd/ext and flex x20 ea  -8 inch fwd/lat step down x20 ea R  -PROM R ankle PF, INV, EVER  -Long sitting gastroc stretching 4x30 sec  -Long sitting knee flexion with DF x30  -Long sitting DF/PF INV/ENV with GTB x20 ea  -Long sitting PF with Blue TB x20  -Seated heel/toe raises 20x5\"  -Seated CW/CCW x10 ea  -Seated alphabet x1  -Seated crunch toe x20  -Shuttle ankle unloading DL squat x8 min 25 lbs EX  -PROM R ankle PF, INV, EVER  -Seated gastroc stretching 2x30 sec  -Shuttle ankle unloading DL squat x5 min 25 lbs    NOTE: All standing activity on # [place] with right walking boot  -Standing hip abd GTB Tubing 3x10 bilat  -SLS balance 4x30 sec bilat  -SL Squatting paralell bars 3x10 bilat EX  -Nu step L5 x8 min  -Shuttle ankle unloading DL squat x5 min 37 lbs  -PROM R ankle PF, INV, EVER  -Long sitting PROM R ankle PF, INV, EVER x20 ea  -Long sitting calf stretch 5 x30 sec  -Standing heel raises x20  -Standing calf stretch 4 x20 sec hold  -Chair squats x20  -Sit<>stand from chair x15   EX  -Shuttle ankle unloading DL squat x5 min 37 lbs  -PROM R ankle PF, INV, EVER  -Long sitting AROM R ankle PF, INV, EVER x20 ea  -Long sitting calf stretch 5 x30 sec  -Standing heel raises x20  -Standing calf stretch 4 x20 sec hold  -Chair squats x20  -Sit<>stand from chair x15   HEP: Verbally heel/toe raises, CW/CCW, toe crunch, Long sitting calf stretch with towel   HEP:  Verbally: Standing heel raises, calf stretch              Charges: MT, EX 2      Total Timed Treatment: 45 min  Total Treatment Time: 48 min  Diagnosis:   Closed bimalleolar fracture of right ankle, initial encounter (P65.571K)         Referring Provider: Jazmyne Garibay  Date of Evaluation:    4/25/2022    Precautions:  WBAT with CAM walker until 5/10/2022 Next MD visit: 5/10/2022  Date of Surgery: 2/24/2022   Insurance (Authorized # of Visits):  15 - 60 units  ([address] [place] [address])    Subjective: Patient reports that he is doing good. Pain: 1/10 medial R ankle on/off      Objective: Patient arrived with right walking boot     AROM: (* denotes performed with pain)  Foot/Ankle Foot/Ankle 5/20/22   DF: R 0 improved tow 5 deg with OP; L 10  PF: R 30 improved to 40; L 50 DF: 6 deg  PF: 41 deg         Accessory motion: restricted posterior TCJ glides       Assessment: Eccentric quad weakness noted with squatting. Decreased swollen. Patient decreased tight and stiffness right calf and achilles, added standing calf stretch, heel raises, ankle rocking and squats, patient with loaded exercises no c/o ankle pain c/o tired and fatigued both quads. Goals: (to be met in 20 visits)  Short-term (4 wks, 8 visits)  Improve ankle mobility and flexibiliyt to at least 10 deg ankle DF.   Improve foot and ankle strength at least 1/2 grade.-Partially Met    Long-Term (10 wks, 20 visits)  Improve FOTO score by 38 pts to demonstrate functional improvement  Able to walk up to 45 min pain-free to be able to go grocery shopping.-Partially Met  Able to negotiate stairs with reciprocal pattern pain-free for pain-free mobility at home.-In progressed  Able to tolerate at least 1 hr standing to be able to perform chores or prepare a meal at home-In Progressed  Be able to squat down with less difficulty and max 3/10 pain to retrieve objects off the ground at home.-Partially Met    Plan: Continue manual therapy therapy to address jt and scar restrictions, restore ankle AROM/PROM, emphasize functional RLE strengthening.     Date: 5/9/2022  Tx#: 6/ (15 auth) Date: 5/12/2022  Tx#: 7/15 Date: 5/18/2022  Tx#: 8/15 Date: 5/20/2022  Tx#: 9/15   MT  -Posterior R talar glides gr 3-  -Gentle surgical scar mobilizations  -R STJ rocking MT  -Posterior R talar glides gr 3-  -Gentle surgical scar mobilizations MT  -Posterior R talar glides gr 3-  -Gentle surgical scar mobilizations MT   EX  -PROM R ankle PF, INV, EVER  -Seated gastroc stretching 2x30 sec  -Shuttle ankle unloading DL squat x5 min 25 lbs    NOTE: All standing activity on # bars with right walking boot  -Standing hip abd GTB Tubing 3x10 bilat  -SLS balance 4x30 sec bilat  -SL Squatting paralell bars 3x10 bilat EX  -Nu step L5 x8 min  -Shuttle ankle unloading DL squat x5 min 37 lbs  -PROM R ankle PF, INV, EVER  -Long sitting PROM R ankle PF, INV, EVER x20 ea  -Long sitting calf stretch 5 x30 sec  -Standing heel raises x20  -Standing calf stretch 4 x20 sec hold  -Chair squats x20  -Sit<>stand from chair x15   EX  -Shuttle ankle unloading DL squat x5 min 37 lbs  -PROM R ankle PF, INV, EVER  -Long sitting AROM R ankle PF, INV, EVER x20 ea  -Long sitting calf stretch 5 x30 sec  -Standing heel raises x20  -Standing calf stretch 4 x20 sec hold  -Chair squats x20  -Sit<>stand from chair x15 EX  -Shuttle ankle unloading DL squat x5 min 62 lbs  -Shuttle SL 37 labs x3 min  -Heel raises x20  -Standing  calf stretch 4x30 sec   -Ankle rocking x20  -[place] board A/P, M/L x20 ea  -Airex tandem stand x2, each 1 min 30 sec without UE hold   -Airex squats x20  -Chair sit<>stand x20     HEP:  Verbally: Standing heel raises, calf stretch               Charges:  EX 3      Total Timed Treatment: 45 min  Total Treatment Time: 48 min

## 2022-05-23 RX ORDER — BUDESONIDE AND FORMOTEROL FUMARATE DIHYDRATE 160; 4.5 UG/1; UG/1
AEROSOL RESPIRATORY (INHALATION)
Qty: 10.2 G | Refills: 3 | OUTPATIENT
Start: 2022-05-23

## 2022-05-25 ENCOUNTER — OFFICE VISIT (OUTPATIENT)
Dept: PHYSICAL THERAPY | Facility: HOSPITAL | Age: 47
End: 2022-05-25
Attending: ORTHOPAEDIC SURGERY
Payer: MEDICAID

## 2022-05-25 PROCEDURE — 97110 THERAPEUTIC EXERCISES: CPT

## 2022-06-01 ENCOUNTER — OFFICE VISIT (OUTPATIENT)
Dept: PHYSICAL THERAPY | Facility: HOSPITAL | Age: 47
End: 2022-06-01
Attending: ORTHOPAEDIC SURGERY
Payer: MEDICAID

## 2022-06-01 PROCEDURE — 97110 THERAPEUTIC EXERCISES: CPT

## 2022-06-03 ENCOUNTER — OFFICE VISIT (OUTPATIENT)
Dept: PHYSICAL THERAPY | Facility: HOSPITAL | Age: 47
End: 2022-06-03
Attending: ORTHOPAEDIC SURGERY
Payer: MEDICAID

## 2022-06-03 PROCEDURE — 97112 NEUROMUSCULAR REEDUCATION: CPT

## 2022-06-03 PROCEDURE — 97110 THERAPEUTIC EXERCISES: CPT

## 2022-06-07 ENCOUNTER — OFFICE VISIT (OUTPATIENT)
Dept: PHYSICAL THERAPY | Facility: HOSPITAL | Age: 47
End: 2022-06-07
Attending: ORTHOPAEDIC SURGERY
Payer: MEDICAID

## 2022-06-07 PROCEDURE — 97110 THERAPEUTIC EXERCISES: CPT

## 2022-06-07 PROCEDURE — 97140 MANUAL THERAPY 1/> REGIONS: CPT

## 2022-06-10 ENCOUNTER — APPOINTMENT (OUTPATIENT)
Dept: PHYSICAL THERAPY | Facility: HOSPITAL | Age: 47
End: 2022-06-10
Attending: ORTHOPAEDIC SURGERY
Payer: MEDICAID

## 2022-06-10 ENCOUNTER — TELEPHONE (OUTPATIENT)
Dept: PHYSICAL THERAPY | Facility: HOSPITAL | Age: 47
End: 2022-06-10

## 2022-06-11 ENCOUNTER — LAB ENCOUNTER (OUTPATIENT)
Dept: LAB | Age: 47
End: 2022-06-11
Attending: INTERNAL MEDICINE
Payer: MEDICAID

## 2022-06-11 ENCOUNTER — ORDER TRANSCRIPTION (OUTPATIENT)
Dept: ADMINISTRATIVE | Facility: HOSPITAL | Age: 47
End: 2022-06-11

## 2022-06-11 DIAGNOSIS — Z01.818 PREOP EXAMINATION: ICD-10-CM

## 2022-06-11 DIAGNOSIS — Z01.818 PREOP EXAMINATION: Primary | ICD-10-CM

## 2022-06-12 LAB — SARS-COV-2 RNA RESP QL NAA+PROBE: NOT DETECTED

## 2022-06-13 ENCOUNTER — HOSPITAL ENCOUNTER (OUTPATIENT)
Facility: HOSPITAL | Age: 47
Setting detail: HOSPITAL OUTPATIENT SURGERY
Discharge: HOME | End: 2022-06-13
Attending: INTERNAL MEDICINE | Admitting: INTERNAL MEDICINE
Payer: MEDICAID

## 2022-06-13 ENCOUNTER — ANESTHESIA (OUTPATIENT)
Dept: ENDOSCOPY | Facility: HOSPITAL | Age: 47
End: 2022-06-13
Payer: MEDICAID

## 2022-06-13 ENCOUNTER — ANESTHESIA EVENT (OUTPATIENT)
Dept: ENDOSCOPY | Facility: HOSPITAL | Age: 47
End: 2022-06-13
Payer: MEDICAID

## 2022-06-13 VITALS
DIASTOLIC BLOOD PRESSURE: 88 MMHG | HEIGHT: 71 IN | OXYGEN SATURATION: 98 % | WEIGHT: 170 LBS | HEART RATE: 73 BPM | BODY MASS INDEX: 23.8 KG/M2 | SYSTOLIC BLOOD PRESSURE: 112 MMHG | RESPIRATION RATE: 16 BRPM

## 2022-06-13 DIAGNOSIS — Z12.11 SCREENING FOR COLON CANCER: ICD-10-CM

## 2022-06-13 PROCEDURE — 0DBP8ZX EXCISION OF RECTUM, VIA NATURAL OR ARTIFICIAL OPENING ENDOSCOPIC, DIAGNOSTIC: ICD-10-PCS | Performed by: INTERNAL MEDICINE

## 2022-06-13 PROCEDURE — 45385 COLONOSCOPY W/LESION REMOVAL: CPT | Performed by: INTERNAL MEDICINE

## 2022-06-13 RX ORDER — LIDOCAINE HYDROCHLORIDE 10 MG/ML
INJECTION, SOLUTION EPIDURAL; INFILTRATION; INTRACAUDAL; PERINEURAL AS NEEDED
Status: DISCONTINUED | OUTPATIENT
Start: 2022-06-13 | End: 2022-06-13 | Stop reason: SURG

## 2022-06-13 RX ORDER — SODIUM CHLORIDE, SODIUM LACTATE, POTASSIUM CHLORIDE, CALCIUM CHLORIDE 600; 310; 30; 20 MG/100ML; MG/100ML; MG/100ML; MG/100ML
INJECTION, SOLUTION INTRAVENOUS CONTINUOUS
Status: DISCONTINUED | OUTPATIENT
Start: 2022-06-13 | End: 2022-06-13

## 2022-06-13 RX ADMIN — SODIUM CHLORIDE, SODIUM LACTATE, POTASSIUM CHLORIDE, CALCIUM CHLORIDE: 600; 310; 30; 20 INJECTION, SOLUTION INTRAVENOUS at 07:32:00

## 2022-06-13 RX ADMIN — LIDOCAINE HYDROCHLORIDE 50 MG: 10 INJECTION, SOLUTION EPIDURAL; INFILTRATION; INTRACAUDAL; PERINEURAL at 07:32:00

## 2022-06-13 NOTE — ANESTHESIA POSTPROCEDURE EVALUATION
Patient: Pancho Soto    Procedure Summary     Date: 06/13/22 Room / Location: [address] [place] [address] ENDOSCOPY 05 / [address] [place] [address] ENDOSCOPY    Anesthesia Start: 0604 Anesthesia Stop:     Procedure: COLONOSCOPY (N/A ) Diagnosis:       Screening for colon cancer      (colon polyp)    Surgeons: Sanford Lozada MD Anesthesiologist: Alexia Alejandre CRNA    Anesthesia Type: MAC ASA Status: 2          Anesthesia Type: No value filed.     Vitals Value Taken Time   BP 94/63 06/13/22 0806   Temp 98.6 06/13/22 0806   Pulse 84 06/13/22 0806   Resp 17 06/13/22 0806   SpO2 98 06/13/22 0806       [facility] AN Post Evaluation:   Patient Evaluated in PACU  Patient Participation: complete - patient participated  Level of Consciousness: awake and alert  Pain Score: 0  Pain Management: adequate  Airway Patency:patent  Dental exam unchanged from preop  Yes    Cardiovascular Status: acceptable  Respiratory Status: acceptable  Postoperative Hydration acceptable      [address], CRNA  6/13/2022 8:06 AM

## 2022-06-13 NOTE — OPERATIVE REPORT
[facility] Endoscopy Report      Date of Procedure:  06/13/22      Preoperative Diagnosis:  Colorectal cancer screening      Postoperative Diagnosis:  Colon polyp      Procedure:    Colonoscopy with polypectomy      Surgeon:  Beverly Rose M.D. Anesthesia:  Monitored anesthesia care  Cecal withdrawal time:  20 minutes  EBL:  Insignificant      Brief History: This is a 42year old (imminently age 41 this week) male who presents for a screening colonoscopy. He has a family history of colon cancer in a grandparent. He has no lower gastrointestinal tract symptoms or signs. Technique:  After informed consent, the patient was placed in the left lateral recumbent position. Digital rectal examination revealed no palpable intraluminal abnormalities. An Olympus variable stiffness 190 series HD colonoscope was inserted into the rectum and advanced under direct vision by following the lumen to the terminal ileum. The colon was examined upon withdrawal in the left lateral recumbent position. Findings:  The preparation of the colon was excellent. The terminal ileum was examined for 5 cm and visually normal.  The ileocecal valve was well preserved. The visualized colonic mucosa from the cecum to the anal verge was normal with an intact vascular pattern. In the proximal rectum there was a 3-4 mm sessile polyp which was cold snare excised and retrieved. The site was closed with a solitary hemostatic clip. No ongoing bleeding. There were no other colonic polyps, definite diverticula, mass lesions, vascular anomalies or signs of inflammation seen. Retroflexion in the rectum with the aid of a gastroscope revealed no abnormalities. The procedure was well tolerated without immediate complication. Impression:  1. Diminutive rectal polyp  2. Otherwise normal colonoscopy to the terminal ileum    Recommendations: Follow-up biopsy results.   Polyp histology to determine recommendations regarding follow-up.         Toribio Florez MD  6/13/2022

## 2022-06-14 ENCOUNTER — TELEPHONE (OUTPATIENT)
Dept: GASTROENTEROLOGY | Facility: CLINIC | Age: 47
End: 2022-06-14

## 2022-06-14 NOTE — TELEPHONE ENCOUNTER
Recall colon in 10 years per Dr. Mague Lyon. Last IBTF:8-32-76  Next MXQ:2-20-57    Updated health maintenance and pt outreach.

## 2022-06-14 NOTE — TELEPHONE ENCOUNTER
----- Message from Catarina Garcia MD sent at 6/13/2022  6:05 PM CDT -----  I spoke to [place]. He is feeling well. His polyp was hyperplastic. I discussed the significance. I have recommended a screening colonoscopy in 10 years. GI RNs: Please enter colonoscopy recall for 10 years.

## 2022-06-15 ENCOUNTER — OFFICE VISIT (OUTPATIENT)
Dept: PHYSICAL THERAPY | Facility: HOSPITAL | Age: 47
End: 2022-06-15
Attending: ORTHOPAEDIC SURGERY
Payer: MEDICAID

## 2022-06-15 PROCEDURE — 97110 THERAPEUTIC EXERCISES: CPT

## 2022-06-15 PROCEDURE — 97140 MANUAL THERAPY 1/> REGIONS: CPT

## 2022-06-17 ENCOUNTER — APPOINTMENT (OUTPATIENT)
Dept: PHYSICAL THERAPY | Facility: HOSPITAL | Age: 47
End: 2022-06-17
Attending: ORTHOPAEDIC SURGERY
Payer: MEDICAID

## 2022-06-20 DIAGNOSIS — R10.13 EPIGASTRIC PAIN: ICD-10-CM

## 2022-06-21 ENCOUNTER — OFFICE VISIT (OUTPATIENT)
Dept: PHYSICAL THERAPY | Facility: HOSPITAL | Age: 47
End: 2022-06-21
Attending: ORTHOPAEDIC SURGERY
Payer: MEDICAID

## 2022-06-21 PROCEDURE — 97140 MANUAL THERAPY 1/> REGIONS: CPT

## 2022-06-21 PROCEDURE — 97110 THERAPEUTIC EXERCISES: CPT

## 2022-06-21 RX ORDER — PANTOPRAZOLE SODIUM 40 MG/1
40 TABLET, DELAYED RELEASE ORAL
Qty: 90 TABLET | Refills: 1 | Status: SHIPPED | OUTPATIENT
Start: 2022-06-21

## 2022-06-23 ENCOUNTER — OFFICE VISIT (OUTPATIENT)
Dept: PHYSICAL THERAPY | Facility: HOSPITAL | Age: 47
End: 2022-06-23
Attending: ORTHOPAEDIC SURGERY
Payer: MEDICAID

## 2022-06-23 PROCEDURE — 97140 MANUAL THERAPY 1/> REGIONS: CPT

## 2022-06-23 PROCEDURE — 97110 THERAPEUTIC EXERCISES: CPT

## 2022-06-28 ENCOUNTER — OFFICE VISIT (OUTPATIENT)
Dept: PHYSICAL THERAPY | Facility: HOSPITAL | Age: 47
End: 2022-06-28
Attending: ORTHOPAEDIC SURGERY
Payer: MEDICAID

## 2022-06-28 PROCEDURE — 97140 MANUAL THERAPY 1/> REGIONS: CPT

## 2022-06-28 PROCEDURE — 97110 THERAPEUTIC EXERCISES: CPT

## 2022-06-30 ENCOUNTER — APPOINTMENT (OUTPATIENT)
Dept: PHYSICAL THERAPY | Facility: HOSPITAL | Age: 47
End: 2022-06-30
Attending: ORTHOPAEDIC SURGERY
Payer: MEDICAID

## 2022-06-30 ENCOUNTER — TELEPHONE (OUTPATIENT)
Dept: PHYSICAL THERAPY | Facility: HOSPITAL | Age: 47
End: 2022-06-30

## 2022-07-08 ENCOUNTER — OFFICE VISIT (OUTPATIENT)
Dept: PHYSICAL THERAPY | Facility: HOSPITAL | Age: 47
End: 2022-07-08
Attending: ORTHOPAEDIC SURGERY
Payer: MEDICAID

## 2022-07-08 PROCEDURE — 97140 MANUAL THERAPY 1/> REGIONS: CPT

## 2022-07-08 PROCEDURE — 97110 THERAPEUTIC EXERCISES: CPT

## 2022-07-15 ENCOUNTER — TELEPHONE (OUTPATIENT)
Dept: PHYSICAL THERAPY | Facility: HOSPITAL | Age: 47
End: 2022-07-15

## 2022-07-15 ENCOUNTER — OFFICE VISIT (OUTPATIENT)
Dept: PULMONOLOGY | Facility: CLINIC | Age: 47
End: 2022-07-15
Payer: MEDICAID

## 2022-07-15 ENCOUNTER — APPOINTMENT (OUTPATIENT)
Dept: PHYSICAL THERAPY | Facility: HOSPITAL | Age: 47
End: 2022-07-15
Attending: ORTHOPAEDIC SURGERY
Payer: MEDICAID

## 2022-07-15 VITALS
HEART RATE: 69 BPM | SYSTOLIC BLOOD PRESSURE: 118 MMHG | WEIGHT: 172 LBS | DIASTOLIC BLOOD PRESSURE: 79 MMHG | RESPIRATION RATE: 14 BRPM | BODY MASS INDEX: 24.08 KG/M2 | OXYGEN SATURATION: 95 % | HEIGHT: 71 IN

## 2022-07-15 DIAGNOSIS — R91.8 LUNG NODULES: Primary | ICD-10-CM

## 2022-07-15 PROCEDURE — 3078F DIAST BP <80 MM HG: CPT | Performed by: INTERNAL MEDICINE

## 2022-07-15 PROCEDURE — 99213 OFFICE O/P EST LOW 20 MIN: CPT | Performed by: INTERNAL MEDICINE

## 2022-07-15 PROCEDURE — 3074F SYST BP LT 130 MM HG: CPT | Performed by: INTERNAL MEDICINE

## 2022-07-15 PROCEDURE — 3008F BODY MASS INDEX DOCD: CPT | Performed by: INTERNAL MEDICINE

## 2022-07-15 NOTE — PROGRESS NOTES
Referring Physician  No primary care provider on file. History of Present Illness  Seen today for follow-up visit in pulmonary clinic. Significant improvement in dyspnea symptoms after starting Symbicort. Not required albuterol MDI or nebulizer treatments. Denies significant cough or wheezing. No significant exacerbations since last visit    Medications  Budesonide-Formoterol Fumarate (SYMBICORT) 160-4.5 MCG/ACT Inhalation Aerosol, Inhale 2 puffs into the lungs 2 (two) times daily. , Disp: 1 each, Rfl: 5  pantoprazole 40 MG Oral Tab EC, Take 1 tablet (40 mg total) by mouth before breakfast. (Patient not taking: Reported on 7/15/2022), Disp: 90 tablet, Rfl: 1  Tiotropium Bromide Monohydrate 18 MCG Inhalation Cap, Inhale 1 capsule (18 mcg total) into the lungs daily. (Patient not taking: Reported on 7/15/2022), Disp: 30 capsule, Rfl: 5  ipratropium-albuterol 0.5-2.5 (3) MG/3ML Inhalation Solution, Take 3 mL by nebulization 3 (three) times daily as needed. (Patient not taking: Reported on 7/15/2022), Disp: 60 each, Rfl: 5  Albuterol Sulfate HFA (PROAIR HFA) 108 (90 Base) MCG/ACT Inhalation Aero Soln, Inhale 2 puffs into the lungs every 6 (six) hours as needed for Wheezing or Shortness of Breath. (Patient not taking: Reported on 7/15/2022), Disp: 1 each, Rfl: 0    No current facility-administered medications on file prior to visit. Allergies  No Known Allergies    Physical Exam  Constitutional: no acute distress  HEENT: PERRL  Neck: supple, no JVD  Cardio: RRR, S1 S2  Respiratory: clear to auscultation bilaterally, no wheezing, rales, rhonchi, crackles  GI: abdomen soft, non tender, active bowel sounds, no organomegaly  Extremities: no clubbing, cyanosis, edema  Neurologic: no gross motor deficits  Skin: warm, dry  Lymphatic: no supraclavicular lymphadenopathy     Assessment  1. Very severe obstructive lung disease  2. Prior nicotine dependence  3.   Lung nodules    Plan  -Patient presents today for pulmonary evaluation for underlying dyspnea, cough and wheezing. I reviewed pulmonary function testing results with evidence of very severe obstructive lung disease seen. Significant clinical improvement after starting maintenance inhaler therapy with Symbicort which I advised him to continue. Infrequently requiring albuterol MDI. -Reviewed Hi Res CT chest from 4//22 with evidence of numerous ill-defined reticular opacities seen. Ill-defined right upper lobe nodule 2.4 cm in size and 2.2 cm nodule seen in the right lower lobe. Some of these nodules stable dating back to January 2019 but no comparison CT available. Discussed differential diagnosis which includes infectious etiology, inflammatory, hypersensitivity pneumonitis. Admits to overall clinical improvement after cleaning mold from basement which may be contributing factor. Recommend repeat high-res CT chest in 12 months duration  -Encouraged ongoing tobacco cessation.       Mitch Ivy DO  Pulmonary Medicine  [facility]

## 2022-07-20 ENCOUNTER — OFFICE VISIT (OUTPATIENT)
Dept: PHYSICAL THERAPY | Facility: HOSPITAL | Age: 47
End: 2022-07-20
Attending: ORTHOPAEDIC SURGERY
Payer: MEDICAID

## 2022-07-20 PROCEDURE — 97140 MANUAL THERAPY 1/> REGIONS: CPT

## 2022-07-20 PROCEDURE — 97110 THERAPEUTIC EXERCISES: CPT

## 2022-07-25 ENCOUNTER — OFFICE VISIT (OUTPATIENT)
Dept: PHYSICAL THERAPY | Facility: HOSPITAL | Age: 47
End: 2022-07-25
Attending: ORTHOPAEDIC SURGERY
Payer: MEDICAID

## 2022-07-25 PROCEDURE — 97110 THERAPEUTIC EXERCISES: CPT

## 2022-07-25 PROCEDURE — 97140 MANUAL THERAPY 1/> REGIONS: CPT

## 2022-07-25 NOTE — PROGRESS NOTES
Diagnosis:   Closed bimalleolar fracture of right ankle, initial encounter (P86.404N)         Referring Provider: Vernon Adrian  Date of Evaluation:    4/25/2022    Precautions:  WBAT with CAM walker until 5/10/2022 Next MD visit: 5/10/2022  Date of Surgery: 2/24/2022   Insurance (Authorized # of Visits):  6 - 24 units  ([address] [place] [address])    Subjective: States ankle is doing well. Not as stiff as it used to be with walking and stairs. Pain: 0/10 Arrival R ankle pain       Objective:     Accessory motion: restricted posterior and anterior TCJ glides, restricted R STJ      Assessment: RLE stability and eccentric control progressing as expected. Will transition to full HEP and DC next visit with encouragement to start working out at the gym regularly. Goals: (to be met in 20 visits)  Short-term (4 wks, 8 visits)  Improve ankle mobility and flexibiliyt to at least 10 deg ankle DF.-Partially Met   Improve foot and ankle strength at least 1/2 grade. MET    Long-Term (10 wks, 20 visits)  Improve FOTO score by 38 pts to demonstrate functional improvement (MET)  Able to walk up to 45 min pain-free to be able to go grocery shopping. Met  Able to negotiate stairs with reciprocal pattern pain-free for pain-free mobility at home.-In progress  Able to tolerate at least 1 hr standing to be able to perform chores or prepare a meal at home MET  Be able to squat down with less difficulty and max 3/10 pain to retrieve objects off the ground at home.-Partially Met      Plan: DC Next visit if doing well.      Date: 6/21/2022  Tx#: 15/15 Date: 6/23/2022  Tx#: 1/6 (16 total) Date: 6/28/2022  Tx#: 2/6 (17 total) Date: 7/8/2022  Tx#: 3/6 (18 total) Date: 7/20/2022  Tx#: 4/6 (19 total) Date: 7/25/2022  Tx#: 5/6 (20 total)    MT: x15 min  -Posterior R talar glide gr 3  -R DF MWM  -R TCJ manip  -STM right calf to decreased tightness and increased tissue length MT: x15 min  -Posterior R talar glide gr 3  -R DF MWM  -R TCJ manip  -STM right calf to decreased tightness and increased tissue length MT: x15 min  -Posterior R talar glide gr 3  -R DF MWM  -R TCJ manip  -STM right calf to decreased tightness and increased tissue length MT: x15 min  -Posterior R talar glide gr 3  -R DF MWM  -R TCJ manip  -FMP R gastroc tissue technique MT: x10 min  -Posterior R talar glide gr 3  -R DF MWM  -R TCJ manip  -R STJ fig-8 gr 3 MT: x10 min  -Posterior R talar glide gr 3  -R DF MWM  -R TCJ manip  -R STJ fig-8 gr 3    EX: x30 min  -R Ankle rocking talar block x20  -Shuttle ankle unloading x5 min 25 lbs  -Shuttle SL squat 50 lbs 3x15 bilat  -3-way BOSU flat up x90 sec ea  -Gastroc heel raises 2 up/1 RLE down 3x10  -Lat eccentric step down 4 in 3x10 bilat  -TEST AND MEASURES TAKEN EX  -R Ankle rocking talar block x20  -Shuttle ankle unloading x5 min 25 lbs  -SL gastroc heel raises RLE down x15  -DL Soleus raises x15   -BOSU Flat up Squat 3x10  -SLS FWD/BWD rockerboard x20 bilat  -Lat eccentric step down 6 in 3x10 bilat  -Gastroc stretch 2x1 min EX  -Recumbent bike x5 min R3 seat 12  -R Ankle rocking talar block x20  -Shuttle ankle unloading x5 min 30 lbs  -Lat eccentric step down 6 in 3x10 bilat  -Gastroc stretch 2x1 min  -BOSU Flat up Squat 3x10 MB Reach, 30 sec rest balancing BOSU  -DL HR gastroc 2 heels up/1 heel down x20  -DL Soleus raises 3x10  -Windmills 3x5 bilat     EX  -R Ankle rocking talar block x20  -Shuttle ankle unloading x5 min 30 lbs  -Gastroc stretch 2x1 min  -BOSU Flat up Squat 3x10 MB Reach, 30 sec rest balancing BOSU  -Stride Stance BOSU Balance bilat x60 sec ea  -FWD and Lateral lunge onto BOSU x15 ea bilat  -DL HR gastroc 2 heels up/1 heel down 3x10 EX  -Gastroc stretch x2 min  -Shuttle ankle unloading x5 min 30 lbs  -S/L R SL Squat Shuttle 40 lbs 3x15  -Stride Stance BOSU Balance bilat x60 sec ea with med ball rotations  -FWD and Lateral lunge onto BOSU 2x15 ea bilat   EX  -Shuttle ankle unloading x5 min 30 lbs  -[employer] level FWD and Lateral x90 sec ea  -FWD step downs 3x10 bilat 4 in  -Windmills with 10# kettle bell 3x10 bilat  -Shuttle DL hopping 35 lbs 2x15  -Shuttle SL hopping 30 lbs 2x15 bilat      HEP - jogging in place, HR 2 up/1 down     HEP - FWD Step downs and windmills with DB               Charges:  EX 2, MT 1    Total Timed Treatment: EX 25 min, MT 13 min  Total Treatment Time: 38 min

## 2022-08-01 ENCOUNTER — OFFICE VISIT (OUTPATIENT)
Dept: PHYSICAL THERAPY | Facility: HOSPITAL | Age: 47
End: 2022-08-01
Attending: ORTHOPAEDIC SURGERY
Payer: MEDICAID

## 2022-08-01 PROCEDURE — 97140 MANUAL THERAPY 1/> REGIONS: CPT

## 2022-08-01 PROCEDURE — 97110 THERAPEUTIC EXERCISES: CPT

## 2022-08-01 NOTE — PROGRESS NOTES
Discharge Summary  Pt has attended 21 visits in Physical Therapy    Diagnosis:   Closed bimalleolar fracture of right ankle, initial encounter (E51.085L)         Referring Provider: Lynette Staley  Date of Evaluation:    4/25/2022    Precautions:  WBAT with CAM walker until 5/10/2022 Next MD visit: 5/10/2022  Date of Surgery: 2/24/2022   Insurance (Authorized # of Visits):  6 - 24 units  ([address] [place] [address])    Subjective: Feels he is 85% better from therapy. Does not have any pain and only stiffness depending on activities. Pain: 0/10 Arrival R ankle pain       Objective:     Accessory motion: restricted posterior and anterior TCJ glides, restricted R STJ      AROM: (* denotes performed with pain)  Foot/Ankle   DF: R 15; L 15  PF: R 50; L 50  Great toe ext: R 45; L 30       Strength/MMT: (* denotes performed with pain)  Hip Foot/Ankle   Flexion: R 5/5; L 5/5  Extension: R 4/5; L 4/5  Abduction: R 4/5; L 4/5 DF: R 5/5; L 5/5  PF: R 5/5; L 5/5  INV: R 5/5; L 5/5  EV: R 5/5; L 5/5  FDB: R 4/5; L 4/5  FHB: R 4/5; L 4/5       Assessment: Pt has achieved all established functional goals and has been given an extensive HEP to maintain functional gains. Pt is no longer appropriate for skilled therapy and will be discharged. Goals: (to be met in 20 visits)  Short-term (4 wks, 8 visits)  Improve ankle mobility and flexibiliyt to at least 10 deg ankle DF. -MET  Improve foot and ankle strength at least 1/2 grade. MET    Long-Term (10 wks, 20 visits)  Improve FOTO score by 38 pts to demonstrate functional improvement (MET)  Able to walk up to 45 min pain-free to be able to go grocery shopping. MET  Able to negotiate stairs with reciprocal pattern pain-free for pain-free mobility at home. -MET  Able to tolerate at least 1 hr standing to be able to perform chores or prepare a meal at home -MET  Be able to squat down with less difficulty and max 3/10 pain to retrieve objects off the ground at home.-MET      FOTO: 85 today, 21 at eval    Plan: DC with full HEP due to goals met. Thank you for your referral. Please co-sign or sign and return this letter via fax as soon as possible to [phone]. If you have any questions, please contact me at Dept: [phone]. Sincerely,  Electronically signed by therapist: Kwasi Pittman, PT    [de-identified] certification required: Yes  I certify the need for these services furnished under this plan of treatment and while under my care.     X___________________________________________________ Date____________________    Certification From: 3/1/3045  To:10/30/2022    Date: 8/1/2022  Tx#: 6/6 (20 total)   MT: x10 min  -Posterior R talar glide gr 3  -R DF MWM  -R TCJ manip  -R STJ fig-8 gr 3   EX  -Shuttle ankle unloading x5 min 30 lbsea  -Shuttle DL hopping 35 lbs 2x15  -Shuttle SL hopping 30 lbs 2x15 bilat  -TEST AND MEASURES   HEP -         Charges:  EX 2, MT 1    Total Timed Treatment: EX 25 min, MT 13 min  Total Treatment Time: 38 min

## 2022-11-22 NOTE — TELEPHONE ENCOUNTER
LOV: 7/15/22  Last refill: 3/22/22    Dr. Destiney Mckeon review and sign pended prescription if agreeable.

## 2022-11-23 RX ORDER — BUDESONIDE AND FORMOTEROL FUMARATE DIHYDRATE 160; 4.5 UG/1; UG/1
2 AEROSOL RESPIRATORY (INHALATION) 2 TIMES DAILY
Qty: 1 EACH | Refills: 5 | Status: SHIPPED | OUTPATIENT
Start: 2022-11-23

## 2023-02-22 ENCOUNTER — OFFICE VISIT (OUTPATIENT)
Dept: INTERNAL MEDICINE CLINIC | Facility: CLINIC | Age: 48
End: 2023-02-22

## 2023-02-22 VITALS
BODY MASS INDEX: 23.24 KG/M2 | HEIGHT: 71 IN | DIASTOLIC BLOOD PRESSURE: 72 MMHG | WEIGHT: 166 LBS | HEART RATE: 78 BPM | SYSTOLIC BLOOD PRESSURE: 117 MMHG

## 2023-02-22 DIAGNOSIS — B07.0 PLANTAR WART OF RIGHT FOOT: Primary | ICD-10-CM

## 2023-02-22 PROCEDURE — 99213 OFFICE O/P EST LOW 20 MIN: CPT | Performed by: INTERNAL MEDICINE

## 2023-02-22 PROCEDURE — 3074F SYST BP LT 130 MM HG: CPT | Performed by: INTERNAL MEDICINE

## 2023-02-22 PROCEDURE — 3078F DIAST BP <80 MM HG: CPT | Performed by: INTERNAL MEDICINE

## 2023-02-22 PROCEDURE — 3008F BODY MASS INDEX DOCD: CPT | Performed by: INTERNAL MEDICINE

## 2023-03-08 ENCOUNTER — LAB ENCOUNTER (OUTPATIENT)
Dept: LAB | Age: 48
End: 2023-03-08
Attending: INTERNAL MEDICINE
Payer: MEDICAID

## 2023-03-08 ENCOUNTER — OFFICE VISIT (OUTPATIENT)
Dept: INTERNAL MEDICINE CLINIC | Facility: CLINIC | Age: 48
End: 2023-03-08

## 2023-03-08 VITALS
WEIGHT: 164 LBS | SYSTOLIC BLOOD PRESSURE: 93 MMHG | HEIGHT: 71 IN | HEART RATE: 67 BPM | DIASTOLIC BLOOD PRESSURE: 60 MMHG | BODY MASS INDEX: 22.96 KG/M2

## 2023-03-08 DIAGNOSIS — E55.9 VITAMIN D DEFICIENCY: ICD-10-CM

## 2023-03-08 DIAGNOSIS — R94.02 ABNORMAL BRAIN SCAN: ICD-10-CM

## 2023-03-08 DIAGNOSIS — Z00.00 ANNUAL PHYSICAL EXAM: Primary | ICD-10-CM

## 2023-03-08 DIAGNOSIS — Z12.5 ENCOUNTER FOR SCREENING FOR MALIGNANT NEOPLASM OF PROSTATE: ICD-10-CM

## 2023-03-08 DIAGNOSIS — Z23 NEED FOR VACCINATION: ICD-10-CM

## 2023-03-08 LAB
ALBUMIN SERPL-MCNC: 3.7 G/DL (ref 3.4–5)
ALBUMIN/GLOB SERPL: 0.9 {RATIO} (ref 1–2)
ALP LIVER SERPL-CCNC: 44 U/L
ALT SERPL-CCNC: 15 U/L
ANION GAP SERPL CALC-SCNC: 3 MMOL/L (ref 0–18)
AST SERPL-CCNC: 15 U/L (ref 15–37)
BILIRUB SERPL-MCNC: 0.5 MG/DL (ref 0.1–2)
BUN BLD-MCNC: 9 MG/DL (ref 7–18)
BUN/CREAT SERPL: 6.6 (ref 10–20)
CALCIUM BLD-MCNC: 9.1 MG/DL (ref 8.5–10.1)
CHLORIDE SERPL-SCNC: 110 MMOL/L (ref 98–112)
CHOLEST SERPL-MCNC: 171 MG/DL (ref ?–200)
CO2 SERPL-SCNC: 29 MMOL/L (ref 21–32)
COMPLEXED PSA SERPL-MCNC: 0.52 NG/ML (ref ?–4)
CREAT BLD-MCNC: 1.36 MG/DL
DEPRECATED RDW RBC AUTO: 45.6 FL (ref 35.1–46.3)
ERYTHROCYTE [DISTWIDTH] IN BLOOD BY AUTOMATED COUNT: 13.3 % (ref 11–15)
EST. AVERAGE GLUCOSE BLD GHB EST-MCNC: 123 MG/DL (ref 68–126)
FASTING PATIENT LIPID ANSWER: YES
FASTING STATUS PATIENT QL REPORTED: YES
GFR SERPLBLD BASED ON 1.73 SQ M-ARVRAT: 65 ML/MIN/1.73M2 (ref 60–?)
GLOBULIN PLAS-MCNC: 4.1 G/DL (ref 2.8–4.4)
GLUCOSE BLD-MCNC: 108 MG/DL (ref 70–99)
HBA1C MFR BLD: 5.9 % (ref ?–5.7)
HCT VFR BLD AUTO: 47.5 %
HDLC SERPL-MCNC: 56 MG/DL (ref 40–59)
HGB BLD-MCNC: 15.6 G/DL
LDLC SERPL CALC-MCNC: 105 MG/DL (ref ?–100)
MCH RBC QN AUTO: 30.2 PG (ref 26–34)
MCHC RBC AUTO-ENTMCNC: 32.8 G/DL (ref 31–37)
MCV RBC AUTO: 91.9 FL
NONHDLC SERPL-MCNC: 115 MG/DL (ref ?–130)
OSMOLALITY SERPL CALC.SUM OF ELEC: 293 MOSM/KG (ref 275–295)
PLATELET # BLD AUTO: 176 10(3)UL (ref 150–450)
POTASSIUM SERPL-SCNC: 4.4 MMOL/L (ref 3.5–5.1)
PROT SERPL-MCNC: 7.8 G/DL (ref 6.4–8.2)
RBC # BLD AUTO: 5.17 X10(6)UL
SODIUM SERPL-SCNC: 142 MMOL/L (ref 136–145)
TRIGL SERPL-MCNC: 51 MG/DL (ref 30–149)
TSI SER-ACNC: 1.45 MIU/ML (ref 0.36–3.74)
VIT D+METAB SERPL-MCNC: 9.8 NG/ML (ref 30–100)
VLDLC SERPL CALC-MCNC: 9 MG/DL (ref 0–30)
WBC # BLD AUTO: 5.9 X10(3) UL (ref 4–11)

## 2023-03-08 PROCEDURE — 80053 COMPREHEN METABOLIC PANEL: CPT | Performed by: INTERNAL MEDICINE

## 2023-03-08 PROCEDURE — 90715 TDAP VACCINE 7 YRS/> IM: CPT | Performed by: INTERNAL MEDICINE

## 2023-03-08 PROCEDURE — 36415 COLL VENOUS BLD VENIPUNCTURE: CPT | Performed by: INTERNAL MEDICINE

## 2023-03-08 PROCEDURE — 82306 VITAMIN D 25 HYDROXY: CPT | Performed by: INTERNAL MEDICINE

## 2023-03-08 PROCEDURE — 80061 LIPID PANEL: CPT | Performed by: INTERNAL MEDICINE

## 2023-03-08 PROCEDURE — 90471 IMMUNIZATION ADMIN: CPT | Performed by: INTERNAL MEDICINE

## 2023-03-08 PROCEDURE — 90677 PCV20 VACCINE IM: CPT | Performed by: INTERNAL MEDICINE

## 2023-03-08 PROCEDURE — 3008F BODY MASS INDEX DOCD: CPT | Performed by: INTERNAL MEDICINE

## 2023-03-08 PROCEDURE — 90472 IMMUNIZATION ADMIN EACH ADD: CPT | Performed by: INTERNAL MEDICINE

## 2023-03-08 PROCEDURE — 83036 HEMOGLOBIN GLYCOSYLATED A1C: CPT | Performed by: INTERNAL MEDICINE

## 2023-03-08 PROCEDURE — 85027 COMPLETE CBC AUTOMATED: CPT | Performed by: INTERNAL MEDICINE

## 2023-03-08 PROCEDURE — 3078F DIAST BP <80 MM HG: CPT | Performed by: INTERNAL MEDICINE

## 2023-03-08 PROCEDURE — 3074F SYST BP LT 130 MM HG: CPT | Performed by: INTERNAL MEDICINE

## 2023-03-08 PROCEDURE — 84443 ASSAY THYROID STIM HORMONE: CPT | Performed by: INTERNAL MEDICINE

## 2023-03-08 PROCEDURE — 99396 PREV VISIT EST AGE 40-64: CPT | Performed by: INTERNAL MEDICINE

## 2023-03-10 ENCOUNTER — TELEPHONE (OUTPATIENT)
Dept: PULMONOLOGY | Facility: CLINIC | Age: 48
End: 2023-03-10

## 2023-03-10 NOTE — TELEPHONE ENCOUNTER
Received fax from [employer] requesting completion of a [facility] 85 form. Faxed to forms department at [phone]. Received confirmation.

## 2023-03-21 ENCOUNTER — TELEPHONE (OUTPATIENT)
Dept: PULMONOLOGY | Facility: CLINIC | Age: 48
End: 2023-03-21

## 2023-03-21 NOTE — TELEPHONE ENCOUNTER
Patient is due for CT Chest in April, reminder letter sent to patient.    Appointment scheduled for 4/14/23 at 9:30 am

## 2023-03-27 ENCOUNTER — TELEPHONE (OUTPATIENT)
Dept: PULMONOLOGY | Facility: CLINIC | Age: 48
End: 2023-03-27

## 2023-03-27 NOTE — TELEPHONE ENCOUNTER
Fax received from [address] [employer] claim. Emailed fax to Forms Dept and sent original to Forms via inter-office envelope.

## 2023-04-07 ENCOUNTER — OFFICE VISIT (OUTPATIENT)
Dept: PULMONOLOGY | Facility: CLINIC | Age: 48
End: 2023-04-07

## 2023-04-07 VITALS
BODY MASS INDEX: 22.96 KG/M2 | OXYGEN SATURATION: 96 % | WEIGHT: 164 LBS | RESPIRATION RATE: 16 BRPM | HEIGHT: 71 IN | DIASTOLIC BLOOD PRESSURE: 65 MMHG | HEART RATE: 78 BPM | SYSTOLIC BLOOD PRESSURE: 103 MMHG

## 2023-04-07 DIAGNOSIS — J44.9 STAGE 4 VERY SEVERE COPD BY GOLD CLASSIFICATION (HCC): Primary | ICD-10-CM

## 2023-04-07 PROCEDURE — 99214 OFFICE O/P EST MOD 30 MIN: CPT | Performed by: INTERNAL MEDICINE

## 2023-04-07 PROCEDURE — 3008F BODY MASS INDEX DOCD: CPT | Performed by: INTERNAL MEDICINE

## 2023-04-07 PROCEDURE — 3078F DIAST BP <80 MM HG: CPT | Performed by: INTERNAL MEDICINE

## 2023-04-07 PROCEDURE — 3074F SYST BP LT 130 MM HG: CPT | Performed by: INTERNAL MEDICINE

## 2023-04-07 NOTE — PROGRESS NOTES
Referring Physician  Lazara Mejias MD    History of Present Illness  Seen today for follow-up visit in pulmonary clinic. Admits to increased dyspnea with exertion over the course last year. Denies significant cough. Some occasional wheezing present. Denies frequent use of nebulizer treatments. Using Symbicort twice daily. States he hired company to remove majority of mold from his home. Medications  Budesonide-Formoterol Fumarate (SYMBICORT) 160-4.5 MCG/ACT Inhalation Aerosol, Inhale 2 puffs into the lungs 2 (two) times daily. , Disp: 1 each, Rfl: 5  ipratropium-albuterol 0.5-2.5 (3) MG/3ML Inhalation Solution, Take 3 mL by nebulization 3 (three) times daily as needed. , Disp: 60 each, Rfl: 5  Albuterol Sulfate HFA (PROAIR HFA) 108 (90 Base) MCG/ACT Inhalation Aero Soln, Inhale 2 puffs into the lungs every 6 (six) hours as needed for Wheezing or Shortness of Breath., Disp: 1 each, Rfl: 0    No current facility-administered medications on file prior to visit. Allergies  No Known Allergies    Physical Exam  Constitutional: no acute distress  HEENT: PERRL  Neck: supple, no JVD  Cardio: RRR, S1 S2  Respiratory: clear to auscultation bilaterally, no wheezing, rales, rhonchi, crackles  GI: abdomen soft, non tender, active bowel sounds, no organomegaly  Extremities: no clubbing, cyanosis, edema  Neurologic: no gross motor deficits  Skin: warm, dry  Lymphatic: no supraclavicular lymphadenopathy     Assessment  1. Very severe obstructive lung disease  2. Prior nicotine dependence  3. Lung nodules    Plan  -Patient presents today for pulmonary evaluation for underlying dyspnea, cough and wheezing. I reviewed pulmonary function testing results with evidence of very severe obstructive lung disease seen. In the past patient had some significant improvement on Symbicort therapy admits to some increased dyspnea with exertion. We will add Spiriva in addition.   Increase use of nebulizer treatments.  -Reviewed Hi Res CT chest from 4//22 with evidence of numerous ill-defined reticular opacities seen. Ill-defined right upper lobe nodule 2.4 cm in size and 2.2 cm nodule seen in the right lower lobe. Some of these nodules stable dating back to January 2019 but no comparison CT available. Discussed differential diagnosis which includes infectious etiology, inflammatory, hypersensitivity pneumonitis. Repeat CT chest pending for next week. Will review results afterwards.  -Encouraged ongoing tobacco cessation.       Finesse Kline DO  Pulmonary Medicine  [facility]3], [facility]

## 2023-04-14 ENCOUNTER — HOSPITAL ENCOUNTER (OUTPATIENT)
Dept: CT IMAGING | Facility: HOSPITAL | Age: 48
Discharge: HOME | End: 2023-04-14
Attending: INTERNAL MEDICINE
Payer: MEDICAID

## 2023-04-14 DIAGNOSIS — R91.1 LUNG NODULE: ICD-10-CM

## 2023-04-14 PROCEDURE — 71250 CT THORAX DX C-: CPT | Performed by: INTERNAL MEDICINE

## 2023-04-21 ENCOUNTER — TELEPHONE (OUTPATIENT)
Dept: INTERNAL MEDICINE CLINIC | Facility: CLINIC | Age: 48
End: 2023-04-21

## 2023-04-22 NOTE — TELEPHONE ENCOUNTER
Patient message 4/14/23 shows forms signed by Dr. Junior Laura signed and faxed to   [employer] [employer].    Left message for eusebio

## 2023-04-25 RX ORDER — BUDESONIDE AND FORMOTEROL FUMARATE DIHYDRATE 160; 4.5 UG/1; UG/1
AEROSOL RESPIRATORY (INHALATION)
Qty: 10.2 G | Refills: 5 | Status: SHIPPED | OUTPATIENT
Start: 2023-04-25

## 2023-04-26 ENCOUNTER — TELEPHONE (OUTPATIENT)
Dept: PULMONOLOGY | Facility: CLINIC | Age: 48
End: 2023-04-26

## 2023-04-26 DIAGNOSIS — R91.1 LUNG NODULE: Primary | ICD-10-CM

## 2023-04-26 NOTE — TELEPHONE ENCOUNTER
----- Message from Alexia Camacho, DO sent at 4/20/2023  1:01 PM CDT -----  May let the patient know that reviewed CT chest results with stable findings compared to previous year.   I recommend follow-up CT chest without contrast [id number] from now for April 2024 closely monitor

## 2023-04-26 NOTE — TELEPHONE ENCOUNTER
Spoke with patient and informed of Dr. Trudie Councilman result note below. Patient verbalized understanding. Dr. Pardeep May: Please review/sign pended order.

## 2023-05-10 ENCOUNTER — OFFICE VISIT (OUTPATIENT)
Dept: INTERNAL MEDICINE CLINIC | Facility: CLINIC | Age: 48
End: 2023-05-10

## 2023-05-10 VITALS
HEIGHT: 71 IN | HEART RATE: 73 BPM | SYSTOLIC BLOOD PRESSURE: 90 MMHG | WEIGHT: 168.81 LBS | BODY MASS INDEX: 23.63 KG/M2 | DIASTOLIC BLOOD PRESSURE: 60 MMHG

## 2023-05-10 DIAGNOSIS — J02.9 SORE THROAT: Primary | ICD-10-CM

## 2023-05-10 DIAGNOSIS — J02.0 STREP THROAT: ICD-10-CM

## 2023-05-10 LAB
CONTROL LINE PRESENT WITH A CLEAR BACKGROUND (YES/NO): YES YES/NO
COVID19 BINAX NOW ANTIGEN: NOT DETECTED
KIT LOT #: 4010 NUMERIC
OPERATOR ID: NORMAL
POCT LOT NUMBER: NORMAL
STREP GRP A CUL-SCR: POSITIVE

## 2023-05-10 PROCEDURE — 3078F DIAST BP <80 MM HG: CPT | Performed by: NURSE PRACTITIONER

## 2023-05-10 PROCEDURE — 87880 STREP A ASSAY W/OPTIC: CPT | Performed by: NURSE PRACTITIONER

## 2023-05-10 PROCEDURE — 3074F SYST BP LT 130 MM HG: CPT | Performed by: NURSE PRACTITIONER

## 2023-05-10 PROCEDURE — 99213 OFFICE O/P EST LOW 20 MIN: CPT | Performed by: NURSE PRACTITIONER

## 2023-05-10 PROCEDURE — 3008F BODY MASS INDEX DOCD: CPT | Performed by: NURSE PRACTITIONER

## 2023-05-10 RX ORDER — AMOXICILLIN AND CLAVULANATE POTASSIUM 875; 125 MG/1; MG/1
1 TABLET, FILM COATED ORAL 2 TIMES DAILY
Qty: 20 TABLET | Refills: 0 | Status: SHIPPED | OUTPATIENT
Start: 2023-05-10 | End: 2023-05-20

## 2023-05-10 NOTE — PATIENT INSTRUCTIONS
Positive Strep Throat    Plan  Hydrate with fluids  Tylenol two tabs every six hours. . Not to exceed 4 grams in one day. Drink Hot tea with lemon  Drink Hot tea with honey  Gargle with warm salt water if you have a sore throat.   Augmentin 875-125mg po BID x 10 days

## 2023-07-27 RX ORDER — ALBUTEROL SULFATE 90 UG/1
2 AEROSOL, METERED RESPIRATORY (INHALATION) EVERY 6 HOURS PRN
Qty: 18 G | Refills: 3 | Status: SHIPPED | OUTPATIENT
Start: 2023-07-27

## 2023-07-27 NOTE — TELEPHONE ENCOUNTER
Refill passed per [employer] [place], [facility] protocol. Please advise on additional refills. Last refilled in 2021. Requested Prescriptions   Pending Prescriptions Disp Refills    albuterol (PROAIR HFA) 108 (90 Base) MCG/ACT Inhalation Aero Soln 1 each 0     Sig: Inhale 2 puffs into the lungs every 6 (six) hours as needed for Wheezing or Shortness of Breath.        Asthma & COPD Medication Protocol Passed - 7/26/2023  1:44 PM        Passed - In person appointment or virtual visit in the past 6 mos or appointment in next 3 mos     Recent Outpatient Visits              2 months ago Sore throat    [address] [facility], Nancy Leonardo APRN    Office Visit    3 months ago Stage 4 very severe COPD by GOLD classification [facility])    [address], [address], [address] Gracy Holman DO    Office Visit    4 months ago [facility]    [facility] Visit    5 months ago Plantar wart of right foot    [address], [address], Christina Christian MD    Office Visit    12 months ago     [place], [place]    Office Visit                           Recent Outpatient Visits              2 months ago Sore throat    [address] [facility], MARILIN Taylor    Office Visit    3 months ago Stage 4 very severe COPD by GOLD classification [facility])    [address], [address] [facility] Gracy Holman DO    Office Visit    4 months ago [facility] physical exam    [facility]    [facility] Visit    5 months ago Plantar wart of right foot    [facility]    [facility] Visit    12 months ago     [place], [place]    Office Visit

## 2023-11-22 RX ORDER — TIOTROPIUM BROMIDE 18 UG/1
1 CAPSULE ORAL; RESPIRATORY (INHALATION) DAILY
Qty: 30 CAPSULE | Refills: 5 | Status: SHIPPED | OUTPATIENT
Start: 2023-11-22

## 2023-12-05 RX ORDER — BUDESONIDE AND FORMOTEROL FUMARATE DIHYDRATE 160; 4.5 UG/1; UG/1
2 AEROSOL RESPIRATORY (INHALATION) 2 TIMES DAILY
Qty: 10.2 G | Refills: 5 | Status: SHIPPED | OUTPATIENT
Start: 2023-12-05

## 2024-04-26 ENCOUNTER — HOSPITAL ENCOUNTER (OUTPATIENT)
Dept: CT IMAGING | Facility: HOSPITAL | Age: 49
Discharge: HOME | End: 2024-04-26
Attending: INTERNAL MEDICINE
Payer: MEDICAID

## 2024-04-26 DIAGNOSIS — R91.1 LUNG NODULE: ICD-10-CM

## 2024-04-26 PROCEDURE — 71250 CT THORAX DX C-: CPT | Performed by: INTERNAL MEDICINE

## 2024-05-09 DIAGNOSIS — R91.8 LUNG NODULES: Primary | ICD-10-CM

## 2024-05-29 RX ORDER — BUDESONIDE AND FORMOTEROL FUMARATE DIHYDRATE 160; 4.5 UG/1; UG/1
2 AEROSOL RESPIRATORY (INHALATION) 2 TIMES DAILY
Qty: 30.6 G | Refills: 1 | Status: SHIPPED | OUTPATIENT
Start: 2024-05-29

## 2024-05-29 NOTE — TELEPHONE ENCOUNTER
Patient called to request a refill for his symbicort and albuterol.     Budesonide-Formoterol Fumarate (SYMBICORT) 160-4.5 MCG/ACT Inhalation Aerosol, Inhale 2 puffs into the lungs 2 (two) times daily., Disp: 10.2 g, Rfl: 5  albuterol (PROAIR HFA) 108 (90 Base) MCG/ACT Inhalation Aero Soln, Inhale 2 puffs into the lungs every 6 (six) hours as needed for Wheezing or Shortness of Breath., Disp: 18 g, Rfl: 3  ipratropium-albuterol 0.5-2.5 (3) MG/3ML Inhalation Solution, Take 3 mL by nebulization 3 (three) times daily as needed., Disp: 60 each, Rfl: 5

## 2024-07-25 ENCOUNTER — TELEPHONE (OUTPATIENT)
Dept: INTERNAL MEDICINE CLINIC | Facility: CLINIC | Age: 49
End: 2024-07-25

## 2024-07-25 ENCOUNTER — OFFICE VISIT (OUTPATIENT)
Dept: PULMONOLOGY | Facility: CLINIC | Age: 49
End: 2024-07-25
Payer: MEDICARE

## 2024-07-25 VITALS
DIASTOLIC BLOOD PRESSURE: 66 MMHG | HEIGHT: 71 IN | BODY MASS INDEX: 21.19 KG/M2 | SYSTOLIC BLOOD PRESSURE: 92 MMHG | OXYGEN SATURATION: 96 % | WEIGHT: 151.38 LBS | HEART RATE: 80 BPM

## 2024-07-25 DIAGNOSIS — R91.1 LUNG NODULE: Primary | ICD-10-CM

## 2024-07-25 PROCEDURE — 3078F DIAST BP <80 MM HG: CPT | Performed by: INTERNAL MEDICINE

## 2024-07-25 PROCEDURE — 3074F SYST BP LT 130 MM HG: CPT | Performed by: INTERNAL MEDICINE

## 2024-07-25 PROCEDURE — 3008F BODY MASS INDEX DOCD: CPT | Performed by: INTERNAL MEDICINE

## 2024-07-25 PROCEDURE — 99213 OFFICE O/P EST LOW 20 MIN: CPT | Performed by: INTERNAL MEDICINE

## 2024-07-25 NOTE — PROGRESS NOTES
Referring Physician  Collin Mc MD    History of Present Illness  Seen today for follow-up visit in pulmonary clinic.  Significant dyspnea since last visit.  No significant exacerbations.  Having CT chest tomorrow.  Compliant with Symbicort and Spiriva    Medications  Current Outpatient Medications on File Prior to Visit   Medication Sig Dispense Refill    Budesonide-Formoterol Fumarate (SYMBICORT) 160-4.5 MCG/ACT Inhalation Aerosol Inhale 2 puffs into the lungs 2 (two) times daily. 30.6 g 1    tiotropium 18 MCG Inhalation Cap Inhale 1 capsule (18 mcg total) into the lungs daily. 30 capsule 5    albuterol (PROAIR HFA) 108 (90 Base) MCG/ACT Inhalation Aero Soln Inhale 2 puffs into the lungs every 6 (six) hours as needed for Wheezing or Shortness of Breath. 18 g 3    ipratropium-albuterol 0.5-2.5 (3) MG/3ML Inhalation Solution Take 3 mL by nebulization 3 (three) times daily as needed. 60 each 5     No current facility-administered medications on file prior to visit.       Allergies  No Known Allergies    Physical Exam  Constitutional: no acute distress  HEENT: PERRL  Neck: supple, no JVD  Cardio: RRR, S1 S2  Respiratory: clear to auscultation bilaterally, no wheezing, rales, rhonchi, crackles  GI: abdomen soft, non tender, active bowel sounds, no organomegaly  Extremities: no clubbing, cyanosis, edema  Neurologic: no gross motor deficits  Skin: warm, dry  Lymphatic: no supraclavicular lymphadenopathy     Assessment  1.  Very severe obstructive lung disease  2.  Prior nicotine dependence  3.  Lung nodules    Plan  -Patient presents today for pulmonary evaluation for follow-up visit.  Denies significant dyspnea symptoms since last visit.  Using Symbicort and Spiriva.  Denies significant exacerbations since last visit.  Infrequent use of albuterol MDI and nebulizer treatments.  -CT chest from 4/26/2024 with multiple bilateral nodules, solid and morphology some solid nodule of increased in size.  Discussed differential  diagnosis which includes infectious etiology, inflammatory, hypersensitivity pneumonitis.  Repeat CT chest pending for later this week..  Will review results afterwards.  -Encouraged ongoing tobacco cessation.      Marysol Schwartz DO  Pulmonary Medicine  [facility]

## 2024-07-26 ENCOUNTER — HOSPITAL ENCOUNTER (OUTPATIENT)
Dept: CT IMAGING | Age: 49
Discharge: HOME | End: 2024-07-26
Attending: INTERNAL MEDICINE
Payer: MEDICAID

## 2024-07-26 DIAGNOSIS — R91.8 LUNG NODULES: ICD-10-CM

## 2024-07-26 PROCEDURE — 71250 CT THORAX DX C-: CPT | Performed by: INTERNAL MEDICINE

## 2024-09-19 ENCOUNTER — LAB ENCOUNTER (OUTPATIENT)
Dept: LAB | Age: 49
End: 2024-09-19
Attending: INTERNAL MEDICINE
Payer: MEDICAID

## 2024-09-19 ENCOUNTER — OFFICE VISIT (OUTPATIENT)
Facility: LOCATION | Age: 49
End: 2024-09-19

## 2024-09-19 VITALS
WEIGHT: 151.81 LBS | OXYGEN SATURATION: 96 % | BODY MASS INDEX: 21.25 KG/M2 | HEIGHT: 71 IN | HEART RATE: 78 BPM | SYSTOLIC BLOOD PRESSURE: 97 MMHG | DIASTOLIC BLOOD PRESSURE: 65 MMHG

## 2024-09-19 DIAGNOSIS — Z87.81 H/O FRACTURE OF ANKLE: ICD-10-CM

## 2024-09-19 DIAGNOSIS — Z13.228 SCREENING FOR ENDOCRINE, NUTRITIONAL, METABOLIC AND IMMUNITY DISORDER: ICD-10-CM

## 2024-09-19 DIAGNOSIS — J44.9 STAGE 4 VERY SEVERE COPD BY GOLD CLASSIFICATION (HCC): ICD-10-CM

## 2024-09-19 DIAGNOSIS — Z12.11 COLON CANCER SCREENING: ICD-10-CM

## 2024-09-19 DIAGNOSIS — Z12.5 SCREENING FOR MALIGNANT NEOPLASM OF PROSTATE: ICD-10-CM

## 2024-09-19 DIAGNOSIS — Z13.21 SCREENING FOR ENDOCRINE, NUTRITIONAL, METABOLIC AND IMMUNITY DISORDER: ICD-10-CM

## 2024-09-19 DIAGNOSIS — Z00.00 ENCOUNTER FOR MEDICARE ANNUAL WELLNESS EXAM: Primary | ICD-10-CM

## 2024-09-19 DIAGNOSIS — E55.9 VITAMIN D DEFICIENCY: ICD-10-CM

## 2024-09-19 DIAGNOSIS — Z13.6 ENCOUNTER FOR SCREENING FOR CORONARY ARTERY DISEASE: ICD-10-CM

## 2024-09-19 DIAGNOSIS — Z00.00 ANNUAL PHYSICAL EXAM: ICD-10-CM

## 2024-09-19 DIAGNOSIS — Z13.29 SCREENING FOR ENDOCRINE, NUTRITIONAL, METABOLIC AND IMMUNITY DISORDER: ICD-10-CM

## 2024-09-19 DIAGNOSIS — F41.1 GAD (GENERALIZED ANXIETY DISORDER): ICD-10-CM

## 2024-09-19 DIAGNOSIS — Z13.0 SCREENING FOR ENDOCRINE, NUTRITIONAL, METABOLIC AND IMMUNITY DISORDER: ICD-10-CM

## 2024-09-19 DIAGNOSIS — M25.371 ANKLE INSTABILITY, RIGHT: ICD-10-CM

## 2024-09-19 PROBLEM — R06.2 WHEEZING: Status: RESOLVED | Noted: 2021-09-22 | Resolved: 2024-09-19

## 2024-09-19 PROBLEM — M54.2 CHRONIC NECK PAIN: Status: RESOLVED | Noted: 2017-08-01 | Resolved: 2024-09-19

## 2024-09-19 PROBLEM — R06.02 SHORTNESS OF BREATH: Status: RESOLVED | Noted: 2021-09-22 | Resolved: 2024-09-19

## 2024-09-19 PROBLEM — S82.841A CLOSED BIMALLEOLAR FRACTURE OF RIGHT ANKLE: Status: RESOLVED | Noted: 2022-02-22 | Resolved: 2024-09-19

## 2024-09-19 PROBLEM — J02.0 STREP THROAT: Status: RESOLVED | Noted: 2023-05-10 | Resolved: 2024-09-19

## 2024-09-19 PROBLEM — L73.9 FOLLICULITIS: Status: RESOLVED | Noted: 2021-09-22 | Resolved: 2024-09-19

## 2024-09-19 PROBLEM — G89.29 CHRONIC NECK PAIN: Status: RESOLVED | Noted: 2017-08-01 | Resolved: 2024-09-19

## 2024-09-19 LAB
ALBUMIN SERPL-MCNC: 4.4 G/DL (ref 3.2–4.8)
ALBUMIN/GLOB SERPL: 1.2 {RATIO} (ref 1–2)
ALP LIVER SERPL-CCNC: 42 U/L
ALT SERPL-CCNC: 9 U/L
ANION GAP SERPL CALC-SCNC: 5 MMOL/L (ref 0–18)
AST SERPL-CCNC: 18 U/L (ref ?–34)
BILIRUB SERPL-MCNC: 0.5 MG/DL (ref 0.3–1.2)
BUN BLD-MCNC: 9 MG/DL (ref 9–23)
BUN/CREAT SERPL: 7.4 (ref 10–20)
CALCIUM BLD-MCNC: 9.4 MG/DL (ref 8.7–10.4)
CHLORIDE SERPL-SCNC: 106 MMOL/L (ref 98–112)
CHOLEST SERPL-MCNC: 184 MG/DL (ref ?–200)
CO2 SERPL-SCNC: 27 MMOL/L (ref 21–32)
CREAT BLD-MCNC: 1.22 MG/DL
DEPRECATED RDW RBC AUTO: 46.5 FL (ref 35.1–46.3)
EGFRCR SERPLBLD CKD-EPI 2021: 74 ML/MIN/1.73M2 (ref 60–?)
ERYTHROCYTE [DISTWIDTH] IN BLOOD BY AUTOMATED COUNT: 13.7 % (ref 11–15)
EST. AVERAGE GLUCOSE BLD GHB EST-MCNC: 128 MG/DL (ref 68–126)
FASTING PATIENT LIPID ANSWER: NO
FASTING STATUS PATIENT QL REPORTED: NO
GLOBULIN PLAS-MCNC: 3.6 G/DL (ref 2–3.5)
GLUCOSE BLD-MCNC: 96 MG/DL (ref 70–99)
HBA1C MFR BLD: 6.1 % (ref ?–5.7)
HCT VFR BLD AUTO: 48.3 %
HDLC SERPL-MCNC: 61 MG/DL (ref 40–59)
HGB BLD-MCNC: 16.2 G/DL
LDLC SERPL CALC-MCNC: 108 MG/DL (ref ?–100)
MCH RBC QN AUTO: 30.6 PG (ref 26–34)
MCHC RBC AUTO-ENTMCNC: 33.5 G/DL (ref 31–37)
MCV RBC AUTO: 91.1 FL
NONHDLC SERPL-MCNC: 123 MG/DL (ref ?–130)
OSMOLALITY SERPL CALC.SUM OF ELEC: 285 MOSM/KG (ref 275–295)
PLATELET # BLD AUTO: 168 10(3)UL (ref 150–450)
POTASSIUM SERPL-SCNC: 3.7 MMOL/L (ref 3.5–5.1)
PROT SERPL-MCNC: 8 G/DL (ref 5.7–8.2)
RBC # BLD AUTO: 5.3 X10(6)UL
SODIUM SERPL-SCNC: 138 MMOL/L (ref 136–145)
TRIGL SERPL-MCNC: 84 MG/DL (ref 30–149)
TSI SER-ACNC: 2.58 MIU/ML (ref 0.55–4.78)
VIT D+METAB SERPL-MCNC: 13.7 NG/ML (ref 30–100)
VLDLC SERPL CALC-MCNC: 14 MG/DL (ref 0–30)
WBC # BLD AUTO: 6.5 X10(3) UL (ref 4–11)

## 2024-09-19 PROCEDURE — 85027 COMPLETE CBC AUTOMATED: CPT | Performed by: INTERNAL MEDICINE

## 2024-09-19 PROCEDURE — 80061 LIPID PANEL: CPT | Performed by: INTERNAL MEDICINE

## 2024-09-19 PROCEDURE — 83036 HEMOGLOBIN GLYCOSYLATED A1C: CPT | Performed by: INTERNAL MEDICINE

## 2024-09-19 PROCEDURE — 84443 ASSAY THYROID STIM HORMONE: CPT | Performed by: INTERNAL MEDICINE

## 2024-09-19 PROCEDURE — 82306 VITAMIN D 25 HYDROXY: CPT | Performed by: INTERNAL MEDICINE

## 2024-09-19 PROCEDURE — 36415 COLL VENOUS BLD VENIPUNCTURE: CPT | Performed by: INTERNAL MEDICINE

## 2024-09-19 PROCEDURE — 80053 COMPREHEN METABOLIC PANEL: CPT | Performed by: INTERNAL MEDICINE

## 2024-09-19 RX ORDER — BUDESONIDE AND FORMOTEROL FUMARATE DIHYDRATE 160; 4.5 UG/1; UG/1
2 AEROSOL RESPIRATORY (INHALATION) 2 TIMES DAILY
Qty: 3 EACH | Refills: 9 | Status: SHIPPED | OUTPATIENT
Start: 2024-09-19

## 2024-09-19 RX ORDER — IPRATROPIUM BROMIDE AND ALBUTEROL SULFATE 2.5; .5 MG/3ML; MG/3ML
3 SOLUTION RESPIRATORY (INHALATION) 3 TIMES DAILY PRN
Qty: 90 EACH | Refills: 9 | Status: SHIPPED | OUTPATIENT
Start: 2024-09-19

## 2024-09-19 RX ORDER — TIOTROPIUM BROMIDE 18 UG/1
1 CAPSULE ORAL; RESPIRATORY (INHALATION) DAILY
Qty: 90 CAPSULE | Refills: 9 | Status: SHIPPED | OUTPATIENT
Start: 2024-09-19

## 2024-09-19 RX ORDER — ALBUTEROL SULFATE 90 UG/1
2 INHALANT RESPIRATORY (INHALATION) EVERY 6 HOURS PRN
Qty: 3 EACH | Refills: 9 | Status: SHIPPED | OUTPATIENT
Start: 2024-09-19

## 2024-09-19 NOTE — PROGRESS NOTES
Subjective:   You Ochoa is a 49 year old male who presents for a MA AHA (Medicare Advantage Annual Health Assessment) and Medicare Subsequent Annual Wellness visit (Pt already had Initial Annual Wellness) and scheduled follow up of multiple significant but stable problems    1.  A history of ankle fracture requiring hardware, about 2 years ago however he continues to have instability of the right ankle and he has problems walking long distances.  He is wondering about a handicap placard.  Discussed seeing specialty to evaluate the hardware.    2.  He has been working on smoking cessation about 1 pack a week however whenever he stopped smoking his anxiety worsens, he states he has been under a lot of stress and he notes that he has been diagnosed with anxiety in the past but has not started any treatment.  States the anxiety has become an issue lately and he would like help.  We discussed treatment options.        History/Other:   Fall Risk Assessment:   He has been screened for Falls and is High Risk. Fall Prevention information provided to patient in After Visit Summary.    Do you feel unsteady when standing or walking?: No  Do you worry about falling?: No  Have you fallen in the past year?: Yes  How many times have you fallen?: (P) 1  Were you injured?: (P) No     Cognitive Assessment:   He had a completely normal cognitive assessment - see flowsheet entries       Functional Ability/Status:   You Ochoa has a completely normal functional assessment. See flowsheet for details.      Depression Screening (PHQ):  PHQ-2 SCORE: 0  , done 9/19/2024             Advanced Directives:   He does NOT have a Living Will. [Do you have a living will?: No]  He does NOT have a Power of  for Health Care. [Do you have a healthcare power of ?: No]  Discussed Advance Care Planning with patient (and family/surrogate if present). Standard forms made available to patient in After Visit Summary.      Patient Active  Problem List   Diagnosis    Family history of colon cancer    Stage 4 very severe COPD by GOLD classification ([facility])    H/O fracture of ankle    KARL (generalized anxiety disorder)    Ankle instability, right     Allergies:  He has No Known Allergies.    Current Medications:  Outpatient Medications Marked as Taking for the 9/19/24 encounter (Office Visit) with Collin Mc MD   Medication Sig    albuterol (PROAIR HFA) 108 (90 Base) MCG/ACT Inhalation Aero Soln Inhale 2 puffs into the lungs every 6 (six) hours as needed for Wheezing or Shortness of Breath.    Budesonide-Formoterol Fumarate (SYMBICORT) 160-4.5 MCG/ACT Inhalation Aerosol Inhale 2 puffs into the lungs 2 (two) times daily.    ipratropium-albuterol 0.5-2.5 (3) MG/3ML Inhalation Solution Take 3 mL by nebulization 3 (three) times daily as needed (cough, wheeze, shortness of breath.).    tiotropium 18 MCG Inhalation Cap Inhale 1 capsule (18 mcg total) into the lungs daily.    sertraline 50 MG Oral Tab Take 0.5 tablets (25 mg total) by mouth daily for 6 days, THEN 1 tablet (50 mg total) daily. FOR ANXIETY. SEE ME IN 4 WEEKS FOR RE-EVALUATION/REFILLS. NO REFILLS ON THIS RX..       Medical History:  He  has a past medical history of Anxiety, Arrhythmia, Asthma ([facility]), Esophageal reflux, and Osteoarthritis.  Surgical History:  He  has a past surgical history that includes other surgical history (2020); colonoscopy (N/A, 6/13/2022); and ankle fracture surgery (Right, 02/2022).   Family History:  His family history includes Cancer in his maternal grandmother; Diabetes in his father and mother; No Known Problems in his sister.  Social History:  He  reports that he has been smoking cigarettes. He has a 25 pack-year smoking history. He has been exposed to tobacco smoke. He has never used smokeless tobacco. He reports current alcohol use. He reports that he does not use drugs.    Tobacco:  Social History     Tobacco Use   Smoking Status Some Days    Current packs/day:  1.00    Average packs/day: 1 pack/day for 25.0 years (25.0 ttl pk-yrs)    Types: Cigarettes    Passive exposure: Past   Smokeless Tobacco Never   Tobacco Comments    Smokes less than half a pack     E-Cigarettes/Vaping       Questions Responses    E-Cigarette Use Never User          E-Cigarette/Vaping Substances       Questions Responses    Nicotine No    THC No    CBD No    Flavoring No          E-Cigarette/Vaping Devices       Questions Responses    Disposable No    Pre-filled or Refillable Cartridge No    Refillable Tank No    Pre-filled Pod No           Tobacco cessation counseling for <3 minutes.      CAGE Alcohol Screen:   CAGE screening score of 0 on 9/19/2024, showing low risk of alcohol abuse.      Patient Care Team:  Collin Mc MD as PCP - General (Internal Medicine)  Shay Godoy PT as Physical Therapist (Physical Therapy)    Review of Systems   Constitutional:  Negative for unexpected weight change.   HENT:  Negative for hearing loss.    Eyes:  Negative for pain and visual disturbance.   Respiratory:  Negative for shortness of breath.    Cardiovascular:  Negative for chest pain, palpitations and leg swelling.   Gastrointestinal:  Negative for abdominal pain and blood in stool.   Genitourinary:  Negative for difficulty urinating and hematuria.   Neurological:  Negative for tremors and syncope.   Psychiatric/Behavioral: Negative.           Objective:   Physical Exam  Vitals and nursing note reviewed.   Constitutional:       General: He is not in acute distress.     Appearance: Normal appearance.   HENT:      Head: Normocephalic.      Right Ear: External ear normal.      Left Ear: External ear normal.   Eyes:      Extraocular Movements: Extraocular movements intact.      Conjunctiva/sclera: Conjunctivae normal.   Cardiovascular:      Rate and Rhythm: Normal rate and regular rhythm.      Pulses: Normal pulses.      Heart sounds: Normal heart sounds.   Pulmonary:      Effort: Pulmonary effort is  normal. No respiratory distress.      Breath sounds: Normal breath sounds. No wheezing.   Abdominal:      General: Abdomen is flat. Bowel sounds are normal.      Tenderness: There is no abdominal tenderness.   Musculoskeletal:         General: Normal range of motion.      Cervical back: Normal range of motion and neck supple.   Skin:     Coloration: Skin is not jaundiced.   Neurological:      General: No focal deficit present.      Mental Status: He is alert and oriented to person, place, and time. Mental status is at baseline.   Psychiatric:         Mood and Affect: Mood normal.         Behavior: Behavior normal.         BP 97/65 (BP Location: Right arm, Patient Position: Sitting, Cuff Size: adult)   Pulse 78   Ht 5' 11\" (1.803 m)   Wt 151 lb 12.8 oz (68.9 kg)   SpO2 96%   BMI 21.17 kg/m²  Estimated body mass index is 21.17 kg/m² as calculated from the following:    Height as of this encounter: 5' 11\" (1.803 m).    Weight as of this encounter: 151 lb 12.8 oz (68.9 kg).    Medicare Hearing Assessment:   Hearing Screening    Screening Method: Whisper Test  Whisper Test Result: Pass         Visual Acuity:   Right Eye Visual Acuity: Corrected Right Eye Chart Acuity: 20/20   Left Eye Visual Acuity: Corrected Left Eye Chart Acuity: 20/20   Both Eyes Visual Acuity: Corrected Both Eyes Chart Acuity: 20/20   Able To Tolerate Visual Acuity: Yes        Assessment & Plan:   You Ochoa is a 49 year old male who presents for a Medicare Assessment.     1. Encounter for Medicare annual wellness exam (Primary)  Plan  Doing ok today     2. Stage 4 very severe COPD by GOLD classification ([facility])  -     Albuterol Sulfate HFA; Inhale 2 puffs into the lungs every 6 (six) hours as needed for Wheezing or Shortness of Breath.  Dispense: 3 each; Refill: 9  -     Budesonide-Formoterol Fumarate; Inhale 2 puffs into the lungs 2 (two) times daily.  Dispense: 3 each; Refill: 9  -     Ipratropium-Albuterol; Take 3 mL by nebulization 3  (three) times daily as needed (cough, wheeze, shortness of breath.).  Dispense: 90 each; Refill: 9  -     Tiotropium Bromide Monohydrate; Inhale 1 capsule (18 mcg total) into the lungs daily.  Dispense: 90 capsule; Refill: 9  Plan  Chronic, well controlled on current medications as noted in medications, denies major adverse effects, continue with present management, continue to monitor labs.     3. Screening for endocrine, nutritional, metabolic and immunity disorder  -     Comp Metabolic Panel (14)  -     Hemoglobin A1C  -     CBC, Platelet; No Differential  -     Lipid Panel  -     TSH W Reflex To Free T4  -     Vitamin D  Plan  As above    4. Annual physical exam  -     Comp Metabolic Panel (14)  -     Hemoglobin A1C  -     CBC, Platelet; No Differential  -     Lipid Panel  -     TSH W Reflex To Free T4  Plan  Overall doing well today. Screening labs, preventive imaging/procedure, and health care gaps addressed as per USPSTF guidelines, orders available electronically via [employer] and in AVS.  Vaccines discussed and administered depending on availability and per patient preference; patient to return for any outstanding vaccines once available.  Patient brought to  to help schedule care gaps and follow up. Further recommendations depending on lab results.      5. Vitamin D deficiency  -     Vitamin D  6. Colon cancer screening  Plan  Recall 2032    7. Screening for malignant neoplasm of prostate  -     Cancel: PSA Total, Screen; Future; Expected date: 09/19/2024  -     PSA Total, Screen; Future; Expected date: 09/19/2024  Plan  His insurance is stating that he does not meet the age requirements, per guidelines PSA testing starts at age 45 and he is 49.  Unsure why this is being refused, he will need to contact his insurance.    8. Encounter for screening for coronary artery disease  -     CT CALCIUM SCORING; Future; Expected date: 09/19/2024  Plan  As above    9. KARL (generalized anxiety disorder)  -      Sertraline HCl; Take 0.5 tablets (25 mg total) by mouth daily for 6 days, THEN 1 tablet (50 mg total) daily. FOR ANXIETY. SEE ME IN 4 WEEKS FOR RE-EVALUATION/REFILLS. NO REFILLS ON THIS RX..  Dispense: 90 tablet; Refill: 0  Plan  Continue with smoking cessation, we will start him on sertraline and taper depending on his response, we discussed possibly starting bupropion for both smoking and anxiety however this may worsen his anxiety and he does not smoke too much, does more of the Coumadin with years of resultant COPD, he does not have good response to sertraline we will consider bupropion.    10. H/O fracture of ankle  -     Ortho Referral - In Network  Plan  As above    11. Ankle instability, right  -     Ortho Referral - In Network  Plan  As above    The patient indicates understanding of these issues and agrees to the plan.  Continue with current treatment plan.  Further testing ordered.  Imaging studies ordered.  Lab work ordered.  Patient reassured.  Reinforced healthy diet, lifestyle, and exercise.      Return in about 4 weeks (around 10/17/2024) for OFFICE OR VIDEO VISIT-, ANXIETY.     Collin Mc MD, 9/19/2024     Supplementary Documentation:   General Health:  In the past six months, have you lost more than 10 pounds without trying?: 2 - No  Has your appetite been poor?: No  Type of Diet: Balanced  How does the patient maintain a good energy level?: Other  How would you describe your daily physical activity?: Light  How would you describe your current health state?: Fair  How do you maintain positive mental well-being?: Social Interaction  On a scale of 0 to 10, with 0 being no pain and 10 being severe pain, what is your pain level?: 0 - (None)  In the past six months, have you experienced urine leakage?: 0-No  At any time do you feel concerned for the safety/well-being of yourself and/or your children, in your home or elsewhere?: No  Have you had any immunizations at another office such as Influenza,  Hepatitis B, Tetanus, or Pneumococcal?: No    Health Maintenance   Topic Date Due    MA Annual Health Assessment  Never done    Annual Depression Screening  01/01/2024    Tobacco Cessation Counseling  Never done    COVID-19 Vaccine (4 - 2023-24 season) 09/01/2024    Influenza Vaccine (1) 10/01/2024    Colorectal Cancer Screening  06/13/2032    DTaP,Tdap,and Td Vaccines (2 - Td or Tdap) 03/08/2033    Pneumococcal Vaccine: Birth to 64yrs  Completed

## 2024-09-19 NOTE — PATIENT INSTRUCTIONS
Understanding Anxiety Disorders  Almost everyone gets nervous now and then. It’s normal to have knots in your stomach before a test. Or for your heart to beat fast on a first date. But an anxiety disorder is much more than a case of nerves. In fact, its symptoms may be overwhelming. But treatment can ease many of these symptoms. Talking with your healthcare provider is the first step.    What are anxiety disorders?  An anxiety disorder causes very strong feelings of panic and fear. These feelings may occur for no clear reason. And they tend to happen again and again. They may prevent you from coping with life. They may cause you great distress. You may then stay away from anything that triggers your fear. In extreme cases, you may never leave the house. Anxiety disorders may cause other symptoms, such as:  Obsessive thoughts that are unwanted and you can’t control  Constant nightmares or painful thoughts of the past  Upset stomach, sweating, and muscle tension  Trouble sleeping or focusing  What causes anxiety disorders?  Anxiety disorders tend to run in families. For some people, childhood abuse or neglect may play a role. For others, stressful life events or trauma may trigger these disorders. Anxiety can trigger low self-esteem and poor coping skills.  Types of anxiety disorders  Panic disorder. This causes a very strong fear of being in danger.  Phobias. These are extreme fears of certain things, places, or events.  Obsessive-compulsive disorder (OCD). This makes you have unwanted thoughts and urges. You also may do certain things over and over.  Posttraumatic stress disorder (PTSD). This occurs in people who have been through a terrible ordeal. It can cause nightmares and flashbacks about the event.  Generalized anxiety disorder. This causes constant worry. It can greatly disrupt your life.    Getting better  You may believe that nothing can help you. Or, you might fear what others may think. But most anxiety  symptoms can be eased. Having an anxiety disorder is nothing to be ashamed of. Most people do best with treatment that combines medicine and individual and group therapy. These aren’t cures. But they can help you live a healthier life.  [employer] last reviewed this educational content on 3/1/2020    © 0817-9469 [employer]. All rights reserved. This information is not intended as a substitute for professional medical care. Always follow your healthcare professional's instructions.          Treating Panic Disorder with Medicine  Panic disorder is a type of anxiety disorder characterized by panic attacks. A panic attack is a sudden, intense fear that lasts for several minutes when there is no real danger. It's accompanied by terror, severe physical symptoms, and a strong need to escape wherever you are. If you have panic disorder, your healthcare provider may prescribe one or more medicines for treatment. Common medicines are described below.      Types of medicines  Medicines to treat panic disorder include:   Anti-anxiety medicine. This medicine relieves symptoms and helps you relax. Your healthcare provider will explain when and how to use it. It may be prescribed for use before entering a situation that makes you anxious. Or you may be told to take it on a regular schedule. Anti-anxiety medicine may make you feel a little sleepy or out of it. Don't drive a car or operate machinery while on this medicine, until you know how it affects you.  Antidepressant medicine. This kind of medicine is often used to treat anxiety, even if you aren't depressed. An antidepressant balances brain chemicals. This helps keep anxiety under control. This medicine is taken on a schedule. It takes a few weeks to start working. If you don't notice a change at first, you may just need more time. But if you don't notice results after the first few weeks, tell your provider.  Tips for taking medicines  Never change your dosage  or stop taking your medicines without talking to your healthcare provider first. Never share your medicine or use someone else’s medicine, even if it's the same medicine and dosage. Keep the following in mind:   Some medicines must be taken on a schedule.  Make this part of a daily routine. For instance, always take your pill before brushing your teeth. A pillbox can help you remember if you've taken your medicine each day.  Medicines are often taken for 6 to 12 months.  Your healthcare provider will then evaluate whether you need to stay on them. Many people who have also had therapy may no longer need medicine to manage anxiety.  You may need to stop taking medicine slowly.  This will give your body time to adjust. When it's time to stop, your provider will tell you more.  If symptoms return, you may need to start taking medicines again.  This isn't your fault. It's just the nature of your anxiety disorder.  Special concerns  Side effects. Medicines may cause side effects. Ask your healthcare provider or pharmacist what you can expect. They may have ideas for avoiding some side effects. You can also check the package insert to learn more about side effects.  Sexual problems. Some antidepressants can affect your desire for sex or your ability to have regular orgasms. A change in dosage or medicine often solves the problem. If you have a sexual side effect that concerns you, tell your provider.  Addiction. Antidepressants are not addictive. And if you've never had a problem with drugs or alcohol, you likely won't have a problem with anti-anxiety medicine. But if you have a history of addiction, you may need to avoid this medicine. Let your provider know if you have an addiction history.    [employer] last reviewed this educational content on 5/1/2020 © 2000-2021 [employer]. All rights reserved. This information is not intended as a substitute for professional medical care. Always follow your  healthcare professional's instructions.          Anxiety Reaction  Anxiety is the feeling we all get when we think something bad might happen. It is a normal response to stress and normally causes only a mild reaction. When anxiety becomes more severe, it can interfere with daily life. In some cases, you may not even be aware of what you’re anxious about. There may also be a genetic link. Or it may be a learned behavior in the home.   Both psychological and physical triggers cause stress reaction. It's often a response to fear or emotional stress, real or imagined. This stress may come from home, family, work, or social relationships.   During an anxiety reaction, you may feel:  Helpless  Nervous  Depressed  Grouchy  Your body may show signs of anxiety in many ways. You may experience:  Dry mouth  Shakiness  Dizziness  Weakness  Trouble breathing  Breathing fast (hyperventilating)  Chest pressure  Sweating  Headache  Nausea  Diarrhea  Tiredness  Inability to sleep  Sexual problems  Home care  Try to find the sources of stress in your life. They may not be obvious. These may include:  Daily hassles of life (such as traffic jams, missed appointments, or car troubles)  Major life changes, both good (new baby or job promotion) and bad (loss of job or loss of loved one)  Overload (feeling that you have too many responsibilities and can't take care of all of them at once)  Feeling helpless or feeling that your problems can't be solved  Notice how your body reacts to stress. Learn to listen to your body signals. This will help you take action before the stress becomes severe.  When you can, do something about the source of your stress. (Avoid hassles, limit the amount of change that happens in your life at one time, and take a break when you feel overloaded).  Unfortunately, many stressful situations can't be avoided. It is necessary to learn how to better manage stress. There are many proven methods that will reduce your  anxiety. These include simple things such as exercise, good nutrition, and adequate rest. Also, there are certain techniques that are helpful:  Relaxation  Breathing exercises  Visualization  Biofeedback  Meditation  For more information about this, talk with your healthcare provider. Or check online or at your local library or bookstore. You'll find many books and audiobooks on this subject.   Follow-up care  If you feel your anxiety is not responding to self-help measures, call your healthcare provider or make an appointment with a counselor. You may need short-term psychological counseling or medicine to help you manage stress.   Call 911  Call 911 if any of these happen:   Trouble breathing  Confusion  Drowsiness or trouble waking up  Fainting or loss of consciousness  Rapid heart rate  Seizure  New chest pain that becomes more severe, lasts longer, or spreads into your shoulder, arm, neck, jaw, or back  When to get medical advice  Call your healthcare provider right away if any of these happen:  Your symptoms get worse  Severe headache not eased by rest and mild pain reliever  [employer] last reviewed this educational content on 4/1/2020    © 5155-0407 [employer]. All rights reserved. This information is not intended as a substitute for professional medical care. Always follow your healthcare professional's instructions.          Your Body’s Response to Anxiety  Anxiety is part of the body’s natural defense system. It's an alert to a threat. The threat may be unknown, vague, or from your own internal fears. While you’re in this state, your feelings can range. You may feel a sense of worry. You may have physical feelings such as a racing heartbeat. These feelings make you want to react to the threat. An anxiety response is common in many situations. But some people may have an anxiety disorder. Then the same response can occur too much or at the wrong times.   Anxiety can be helpful  Anxiety is a  signal from your brain. It warns you of a threat. It's a common response to help you prevent something. Or it helps you to decrease the bad effects of something you can't control. For example, anxiety is a response to things that might harm your body, separate you from a loved one, or cause you to lose your job. The symptoms of anxiety can be physical and mental.   How does it feel?  People with anxiety may have any of these symptoms:  Dizziness  Muscle tension or pain  Restlessness  Sleeplessness  Trouble focusing  Racing heartbeat  Fast breathing  Shaking or trembling  Stomachache  Diarrhea  Loss of energy  Sweating  Cold, sweaty hands  Chest pain  Dry mouth  Anxiety can also be a problem  Anxiety can become a problem when it is:     Hard to control  Occurs for months  Causes problems with important parts of your life    With an anxiety disorder, your body has symptoms of anxiety too often or too much. The response depends on the type of anxiety disorder. With some types of disorders, the anxiety is much more than needed for the threat that triggers it. With other types, anxiety may occur when there isn’t a clear threat or trigger.   Who has anxiety?  Some people are more likely to have a lot of anxiety than others. This tends to run in families. And it affects more younger people than older people. It's more common in women than men. But no age, race, or gender is immune to anxiety problems.   Anxiety can be treated  Anxiety that disrupts your life can be treated. Your healthcare provider can rule out any physical problems that may cause the anxiety symptoms. If you are diagnosed with an anxiety disorder, mental healthcare will help.   An anxiety disorder is an illness. It can respond to treatment. Most types of anxiety disorders are helped with talk therapy (counseling) and medicines. Your healthcare provider can help you develop skills to cope with anxiety. You can work to gain perspective. This can help you  overcome fears. Good sources of support or guidance can be found in many places. You’ll find support at your local hospital, mental health clinic, or an employee assistance program (EAP).     How to manage anxiety  Here are some things you can do to cope:  Do what you can. Keep in mind that you can’t control everything. Change what you can. And let the rest take its course.  Exercise. This is a great way to ease tension. It can help your body feel relaxed.  Don't use caffeine or nicotine. These can make anxiety symptoms worse.  Stay sober. Don't use alcohol or misuse prescribed medicines. They only make things worse over time.  Learn more about anxiety disorders.  Keep track of helpful online resources and books you can use during stressful periods.  Work on stress management.Try methods such as meditation.  Talk with others.Join an online or in-person support group.  Get medical care.Mental health services can help you manage your symptoms if the above methods don't help enough.  [employer] last reviewed this educational content on 4/1/2020 © 2000-2021 [employer]. All rights reserved. This information is not intended as a substitute for professional medical care. Always follow your healthcare professional's instructions.

## 2024-11-27 ENCOUNTER — OFFICE VISIT (OUTPATIENT)
Dept: ORTHOPEDICS CLINIC | Facility: CLINIC | Age: 49
End: 2024-11-27
Payer: MEDICAID

## 2024-11-27 ENCOUNTER — HOSPITAL ENCOUNTER (OUTPATIENT)
Dept: GENERAL RADIOLOGY | Age: 49
Discharge: HOME | End: 2024-11-27
Attending: ORTHOPAEDIC SURGERY
Payer: MEDICAID

## 2024-11-27 VITALS — DIASTOLIC BLOOD PRESSURE: 72 MMHG | HEART RATE: 90 BPM | SYSTOLIC BLOOD PRESSURE: 107 MMHG

## 2024-11-27 DIAGNOSIS — M62.461 CONTRACTURE OF MUSCLE OF RIGHT LOWER LEG: ICD-10-CM

## 2024-11-27 DIAGNOSIS — M19.071 ARTHRITIS OF RIGHT ANKLE: Primary | ICD-10-CM

## 2024-11-27 DIAGNOSIS — M19.071 ARTHRITIS OF RIGHT ANKLE: ICD-10-CM

## 2024-11-27 PROCEDURE — 73610 X-RAY EXAM OF ANKLE: CPT | Performed by: ORTHOPAEDIC SURGERY

## 2024-11-27 RX ORDER — TRIAMCINOLONE ACETONIDE 40 MG/ML
40 INJECTION, SUSPENSION INTRA-ARTICULAR; INTRAMUSCULAR ONCE
Status: COMPLETED | OUTPATIENT
Start: 2024-11-27 | End: 2024-11-27

## 2024-11-27 RX ORDER — NAPROXEN 500 MG/1
500 TABLET ORAL 2 TIMES DAILY PRN
Qty: 30 TABLET | Refills: 0 | Status: SHIPPED | OUTPATIENT
Start: 2024-11-27

## 2024-11-27 RX ADMIN — TRIAMCINOLONE ACETONIDE 40 MG: 40 INJECTION, SUSPENSION INTRA-ARTICULAR; INTRAMUSCULAR at 16:13:00

## 2024-11-27 NOTE — PROGRESS NOTES
Per Dr. Stewart verbal order to draw up 2 ml of Lidocaine and 1 ml of Kenalog for Right ankle injection

## 2024-11-27 NOTE — H&P
[facility] New Patient Note    CC:   Chief Complaint   Patient presents with    Ankle Pain     Consult - Referred by Dr. Mc for Right ankle pain. Patient rates his pain 4/10 at this time.        HPI: This 49 year old male presents today with complaints of right ankle pain. History of ankle fracture slipping on ice going out to the garbage 2 years ago. Now for the last 7-8 months he has more pain on the lateral aspect of his ankle that is worsening. He is having trboule walking. 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    Past Medical History:    Anxiety    Arrhythmia    Asthma (HCC)    Esophageal reflux    Osteoarthritis     Past Surgical History:   Procedure Laterality Date    Ankle fracture surgery Right 02/2022    plate and screws    Colonoscopy N/A 6/13/2022    Procedure: COLONOSCOPY;  Surgeon: Roby Ayon MD;  Location: [facility] ENDOSCOPY    Other surgical history  2020    left shoulder surgery      Current Outpatient Medications   Medication Sig Dispense Refill    albuterol (PROAIR HFA) 108 (90 Base) MCG/ACT Inhalation Aero Soln Inhale 2 puffs into the lungs every 6 (six) hours as needed for Wheezing or Shortness of Breath. 3 each 9    Budesonide-Formoterol Fumarate (SYMBICORT) 160-4.5 MCG/ACT Inhalation Aerosol Inhale 2 puffs into the lungs 2 (two) times daily. 3 each 9    ipratropium-albuterol 0.5-2.5 (3) MG/3ML Inhalation Solution Take 3 mL by nebulization 3 (three) times daily as needed (cough, wheeze, shortness of breath.). 90 each 9    tiotropium 18 MCG Inhalation Cap Inhale 1 capsule (18 mcg total) into the lungs daily. 90 capsule 9    sertraline 50 MG Oral Tab Take 0.5 tablets (25 mg total) by mouth daily for 6 days, THEN 1 tablet (50 mg total) daily. FOR ANXIETY. SEE ME IN 4 WEEKS FOR RE-EVALUATION/REFILLS. NO REFILLS ON THIS RX.. 90 tablet 0     Allergies[1]  Family History   Problem Relation Age of Onset    Diabetes Father      Diabetes Mother     Cancer Maternal Grandmother         colon    No Known Problems Sister      Social History     Occupational History    Not on file   Tobacco Use    Smoking status: Some Days     Current packs/day: 1.00     Average packs/day: 1 pack/day for 25.0 years (25.0 ttl pk-yrs)     Types: Cigarettes     Passive exposure: Past    Smokeless tobacco: Never    Tobacco comments:     Smokes less than half a pack   Vaping Use    Vaping status: Never Used   Substance and Sexual Activity    Alcohol use: Yes     Comment: occasionally    Drug use: Never    Sexual activity: Not on file          Physical Exam:    There were no vitals taken for this visit.  General: Awake, alert, no distress.   Psychological: Appropriate affect.  Respiratory: Unlabored breathing.  Gait: Antalgic gait  Right ankle neutral alignment, TTP lateral gutter, mild flexible pesplanovalgus. Lacks 5 degrees of ankle DF to neutral with knee extension. Lateral ankle incision well healed. Ankle minimally tender. PTT mild TTP  DF/PF/EHL intact, SILT, cap refill brisk        Imaging: Right ankle 3 views personally viewed, independently interpreted and radiology report read. Status post ORIF of the distal fibula with well-healed fracture and stable alignment of hardware.  Medial malleoli are avulsion fracture present with stable alignment.  Neutral alignment of tibiotalar joint with mild degenerative changes of the tibiotalar joint.  Subchondral sclerosis noted along distal tibia and blunting of cartilage noted along talar dome.      Labs: Hemoglobin A1c elevated at 6.1, CMP with improved creatinine at 1.22 and low alk phos and ALT.  Vitamin D low at 13.7, normal TSH, CBC normal      Assessment/Plan: Patient is a 49-year-old male with  1. Arthritis of right ankle.  We discussed conservative and surgical treatment options.  Patient would like to proceed with an injection today.    Procedure: Sterile injection provided to the anterior ankle fibulotalar  joint with 40 mg of Kenalog and 2 cc of lidocaine.  He tolerated this well.  Sterile bandage was placed.  Sterile technique was used.  25-gauge needle was used to access the joint.    -PT  -Naproxen    2. Contracture of muscle of right lower leg  We discussed calf and hamstring stretching exercises.  We discussed potential future surgical intervention.  Patient would benefit from MRI if he continues to have significant pain.    Patient will follow-up in 8 weeks to assess his progress.  If continued pain we may consider MRI at that time.      Edie Stewart, DO  11/27/2024         [1] No Known Allergies

## 2025-01-06 ENCOUNTER — HOSPITAL ENCOUNTER (EMERGENCY)
Facility: HOSPITAL | Age: 50
Discharge: HOME | End: 2025-01-06
Attending: EMERGENCY MEDICINE
Payer: MEDICARE

## 2025-01-06 ENCOUNTER — APPOINTMENT (OUTPATIENT)
Dept: GENERAL RADIOLOGY | Facility: HOSPITAL | Age: 50
End: 2025-01-06
Payer: MEDICARE

## 2025-01-06 VITALS
TEMPERATURE: 100 F | HEART RATE: 98 BPM | OXYGEN SATURATION: 93 % | RESPIRATION RATE: 20 BRPM | DIASTOLIC BLOOD PRESSURE: 84 MMHG | SYSTOLIC BLOOD PRESSURE: 123 MMHG

## 2025-01-06 DIAGNOSIS — B33.8 RESPIRATORY SYNCYTIAL VIRUS (RSV): Primary | ICD-10-CM

## 2025-01-06 LAB
FLUAV + FLUBV RNA SPEC NAA+PROBE: NEGATIVE
FLUAV + FLUBV RNA SPEC NAA+PROBE: NEGATIVE
RSV RNA SPEC NAA+PROBE: POSITIVE
SARS-COV-2 RNA RESP QL NAA+PROBE: NOT DETECTED

## 2025-01-06 PROCEDURE — 99284 EMERGENCY DEPT VISIT MOD MDM: CPT

## 2025-01-06 PROCEDURE — 0241U SARS-COV-2/FLU A AND B/RSV BY PCR (GENEXPERT): CPT | Performed by: EMERGENCY MEDICINE

## 2025-01-06 PROCEDURE — 71045 X-RAY EXAM CHEST 1 VIEW: CPT

## 2025-01-06 PROCEDURE — 93005 ELECTROCARDIOGRAM TRACING: CPT

## 2025-01-06 PROCEDURE — 0241U SARS-COV-2/FLU A AND B/RSV BY PCR (GENEXPERT): CPT

## 2025-01-06 PROCEDURE — 93010 ELECTROCARDIOGRAM REPORT: CPT

## 2025-01-06 RX ORDER — BENZONATATE 100 MG/1
100 CAPSULE ORAL 3 TIMES DAILY PRN
Qty: 30 CAPSULE | Refills: 0 | Status: SHIPPED | OUTPATIENT
Start: 2025-01-06 | End: 2025-02-05

## 2025-01-06 RX ORDER — ALBUTEROL SULFATE 90 UG/1
2 INHALANT RESPIRATORY (INHALATION) EVERY 4 HOURS PRN
Qty: 1 EACH | Refills: 0 | Status: SHIPPED | OUTPATIENT
Start: 2025-01-06 | End: 2025-02-05

## 2025-01-06 RX ORDER — PREDNISONE 20 MG/1
40 TABLET ORAL DAILY
Qty: 10 TABLET | Refills: 0 | Status: SHIPPED | OUTPATIENT
Start: 2025-01-06 | End: 2025-01-11

## 2025-01-06 NOTE — ED INITIAL ASSESSMENT (HPI)
Pt c/o of LINWOOD, productive cough, wheezing headache, and low O2sat at home.  Denies fevers. Hx of COPD

## 2025-01-07 ENCOUNTER — PATIENT OUTREACH (OUTPATIENT)
Dept: CASE MANAGEMENT | Age: 50
End: 2025-01-07

## 2025-01-07 LAB
ATRIAL RATE: 102 BPM
P AXIS: 85 DEGREES
P-R INTERVAL: 126 MS
Q-T INTERVAL: 322 MS
QRS DURATION: 80 MS
QTC CALCULATION (BEZET): 419 MS
R AXIS: -16 DEGREES
T AXIS: 74 DEGREES
VENTRICULAR RATE: 102 BPM

## 2025-01-07 NOTE — PROGRESS NOTES
Transitions of Care Navigation  Discharge Date: 25  Contact Date: 2025       Disposition and Plan      Clinical Impression:  1. Respiratory syncytial virus (RSV)      Transitions of Care Assessment:  ALEJANDRO Initial Assessment    General:  Assessment completed with: Patient  Patient Subjective: Pt feeling better, since ER discharge--tolerating medications, as prescribed, appetite good, staying hydrated, independent with ADLs. Pt denies fever, chills, headache, vision changes, dizziness, nausea, vomiting, diarrhea, bleeding, irregular heartbeat or fast pulse, loss of vision, speech or strength or coordination in any body part, calf pain or swelling, chest pain or shortness of breath at this time--speaking in full, clear sentences. Pt with congested cough--confirms he does have pulse oximeter, but has not checked )2 sat, yet, today.  Chief Complaint: Clinical Impression:  1. Respiratory syncytial virus (RSV)  Verify patient name and  with patient/ caregiver: Yes    Hospital Stay/Discharge:  Tell me what you understand of why you were in the hospital or emergency department: Pt c/o of LINWOOD, productive cough, wheezing headache, and low O2sat at home. Denies fevers. Hx of COPD  Prior to leaving the hospital were your Discharge Instructions reviewed with you?: Yes  Did you receive a copy of your written Discharge Instructions?: Yes  What questions do you have about your Discharge Instructions?: none  Do you feel better or worse since you left the hospital or emergency department?: Better    Follow - Up Appointment:  Do you have a follow-up appointment?: No  Are there any barriers to getting to your follow-up appointment?: No    Home Health/DME:  Prior to leaving the hospital was Home Health (HH) arranged for you?: No     Prior to leaving the hospital or emergency department was Durable Medical Equipment (DME), medical supplies, or infusions arranged for you?: No  Are DME/medical supply/infusions needs identified by  staff during this assessment?: No     Medications/Diet:  Did any of your medications change, during or after your hospital stay or ED visit?: Yes  Do you have your new or updated medications?: Yes  Do you understand what your medications are for and possible side effects?: Yes  Are there any reasons that keep you from taking your medication as prescribed?: No  Any concerns about medication refills?: No    Were you given a different diet per your Discharge Instructions?: No  Reason: not required     Questions/Concerns:  Do you have any questions or concerns that have not been discussed?: No     ALEJANDRO Follow-up Assessment    General:  Assessment completed with: Patient  Patient Subjective: Pt feeling better, since ER discharge--tolerating medications, as prescribed, appetite good, staying hydrated, independent with ADLs. Pt denies fever, chills, headache, vision changes, dizziness, nausea, vomiting, diarrhea, bleeding, irregular heartbeat or fast pulse, loss of vision, speech or strength or coordination in any body part, calf pain or swelling, chest pain or shortness of breath at this time--speaking in full, clear sentences. Pt with congested cough--confirms he does have pulse oximeter, but has not checked )2 sat, yet, today.  Chief Complaint: Clinical Impression:  1. Respiratory syncytial virus (RSV)  Community Resources: Other (none)    Progress/Care Plan:  Is the patient progressing as planned?: Yes  Frequency/Follow Up Plan: Patient has met goals, no further outreach needed.     Nursing Interventions: Discussed diet, activity, medications and need for f/u visits. Offered ALEJANDRO/ER f/u with PCP or ISADORA GASTON (per PCP recommendations--via secure chat)--pt declines--prefers to rest at home--PCP notified and is in agreement. Patient has met goals, no further outreach needed. Patient aware when to contact PCP/specialists and when to seek emergency care. No further questions/concerns at this  time.      Medications:  Current Outpatient Medications   Medication Sig Dispense Refill    predniSONE 20 MG Oral Tab Take 2 tablets (40 mg total) by mouth daily for 5 days. 10 tablet 0    albuterol 108 (90 Base) MCG/ACT Inhalation Aero Soln Inhale 2 puffs into the lungs every 4 (four) hours as needed. 1 each 0    benzonatate 100 MG Oral Cap Take 1 capsule (100 mg total) by mouth 3 (three) times daily as needed for cough. 30 capsule 0    naproxen 500 MG Oral Tab Take 1 tablet (500 mg total) by mouth 2 (two) times daily as needed (pain). 30 tablet 0    albuterol (PROAIR HFA) 108 (90 Base) MCG/ACT Inhalation Aero Soln Inhale 2 puffs into the lungs every 6 (six) hours as needed for Wheezing or Shortness of Breath. 3 each 9    Budesonide-Formoterol Fumarate (SYMBICORT) 160-4.5 MCG/ACT Inhalation Aerosol Inhale 2 puffs into the lungs 2 (two) times daily. 3 each 9    ipratropium-albuterol 0.5-2.5 (3) MG/3ML Inhalation Solution Take 3 mL by nebulization 3 (three) times daily as needed (cough, wheeze, shortness of breath.). 90 each 9    tiotropium 18 MCG Inhalation Cap Inhale 1 capsule (18 mcg total) into the lungs daily. 90 capsule 9         START taking these medications     Details   predniSONE 20 MG Oral Tab Take 2 tablets (40 mg total) by mouth daily for 5 days.  Qty: 10 tablet, Refills: 0       !! albuterol 108 (90 Base) MCG/ACT Inhalation Aero Soln Inhale 2 puffs into the lungs every 4 (four) hours as needed.  Qty: 1 each, Refills: 0       benzonatate 100 MG Oral Cap Take 1 capsule (100 mg total) by mouth 3 (three) times daily as needed for cough.  Qty: 30 capsule, Refills: 0        !! - Potential duplicate medications found. Please discuss with provider.       Follow-up Appointments:  Follow-up:  Collin Mc MD  [address] [facility] [address]  [phone]    [facility]  Was TCC Ordered: No    Primary Care Provider (If no TCC appointment)  Does patient already have a PCP appointment  scheduled? No  Nurse Care Manager Attempted to schedule PCP office ER Follow-up appointment with patient   -If no appointment scheduled: Explain pt declines    Specialist  Does the patient have any other follow-up appointment(s) need to be scheduled? No     Book By Date: 1/13/2025

## 2025-01-07 NOTE — ED PROVIDER NOTES
Patient Seen in: [facility] Emergency Department    History     Chief Complaint   Patient presents with    Difficulty Breathing     Stated Complaint: Difficulty Breathing, Headache, Back Spasms, Low O2 at home (90)    HPI    Patient here with cough, congestion for several days.  No travel, no known sick contacts.  cough no productive of sputum.  Sinus congestion noted.  no chest pain.   No calf pain or swelling.    DVT Risk factors: no calf debbie nor swelling        Past Medical History:    Anxiety    Arrhythmia    Asthma (HCC)    Esophageal reflux    Osteoarthritis       Past Surgical History:   Procedure Laterality Date    Ankle fracture surgery Right 02/2022    plate and screws    Colonoscopy N/A 6/13/2022    Procedure: COLONOSCOPY;  Surgeon: Roby Ayon MD;  Location: [facility] ENDOSCOPY    Other surgical history  2020    left shoulder surgery             Family History   Problem Relation Age of Onset    Diabetes Father     Diabetes Mother     Cancer Maternal Grandmother         colon    No Known Problems Sister        Social History     Socioeconomic History    Marital status: Single   Tobacco Use    Smoking status: Some Days     Current packs/day: 1.00     Average packs/day: 1 pack/day for 25.0 years (25.0 ttl pk-yrs)     Types: Cigarettes     Passive exposure: Past    Smokeless tobacco: Never    Tobacco comments:     Smokes less than half a pack   Vaping Use    Vaping status: Never Used   Substance and Sexual Activity    Alcohol use: Yes     Comment: occasionally    Drug use: Never       Review of Systems    Positive for stated complaint: Difficulty Breathing, Headache, Back Spasms, Low O2 at home (90)  Other systems are as noted in HPI.  Constitutional and vital signs reviewed.      All other systems reviewed and negative except as noted above.    PSFH elements reviewed from today and agreed except as otherwise stated in HPI.    Physical Exam     ED Triage Vitals [01/06/25 1707]   /80    Pulse 102   Resp 22   Temp 99.6 °F (37.6 °C)   Temp src Temporal   SpO2 92 %   O2 Device None (Room air)       Current:/80   Pulse 102   Temp 99.6 °F (37.6 °C) (Temporal)   Resp 22   SpO2 92%   PULSE OX nl  GENERAL: awake, non toxic, no sig resp distress  HEAD: normocephalic, atraumatic  EYES: sclera non icteric bilateral, conjunctiva clear    NECK: supple, no adenopathy, no thyromegaly  THROAT: pnd noted, post phaynx injected,  LUNGS:  coarse rhonchi bilateral  CARDIO: RRR without murmur  EXTREMITIES: from, 5/5 strength in all 4, no calf tenderness or sig edema,  GI: soft, non-tender, normal bowel sounds  SKIN: good skin turgor, no obvious rashes  Differential to include: URI vs. rhinonsinusitis vs. Bronchitis vs. Pneumonia         ED Course     Labs Reviewed   SARS-COV-2/FLU A AND B/RSV BY PCR (GENEXPERT) - Abnormal; Notable for the following components:       Result Value    RSV by PCR Positive (*)     All other components within normal limits    Narrative:     This test is intended for the qualitative detection and differentiation of SARS-CoV-2, influenza A, influenza B, and respiratory syncytial virus (RSV) viral RNA in nasopharyngeal or nares swabs from individuals suspected of respiratory viral infection consistent with COVID-19 by their healthcare provider. Signs and symptoms of respiratory viral infection due to SARS-CoV-2, influenza, and RSV can be similar.    Test performed using the Xpert Xpress SARS-CoV-2/FLU/RSV (real time RT-PCR)  assay on the GeneXpert instrument, [employer], [place] [address].   This test is being used under the Food and Drug Administration's Emergency Use Authorization.    The authorized Fact Sheet for Healthcare Providers for this assay is available upon request from the laboratory.     EKG    Rate, intervals and axes as noted on EKG Report.  Rate: 102  Rhythm: Sinus Rhythm  Reading: s tach no acute st changes           MDM     Radiology:No results found.  I reviewed  xray noted no infiltrates no pneumothorax      Medical Decision Making  Problems Addressed:  Respiratory syncytial virus (RSV): acute illness or injury    Amount and/or Complexity of Data Reviewed  Labs: ordered. Decision-making details documented in ED Course.  Radiology: ordered and independent interpretation performed. Decision-making details documented in ED Course.  ECG/medicine tests: ordered and independent interpretation performed. Decision-making details documented in ED Course.  Discussion of management or test interpretation with external provider(s): Tylenol, motrin recommended.      Risk  OTC drugs.  Prescription drug management.          Disposition and Plan     Clinical Impression:  1. Respiratory syncytial virus (RSV)        Disposition:  Discharge    Follow-up:  Collin Mc MD  [address] [facility] [address]  [phone]    Follow up        Medications Prescribed:  Current Discharge Medication List        START taking these medications    Details   predniSONE 20 MG Oral Tab Take 2 tablets (40 mg total) by mouth daily for 5 days.  Qty: 10 tablet, Refills: 0      !! albuterol 108 (90 Base) MCG/ACT Inhalation Aero Soln Inhale 2 puffs into the lungs every 4 (four) hours as needed.  Qty: 1 each, Refills: 0      benzonatate 100 MG Oral Cap Take 1 capsule (100 mg total) by mouth 3 (three) times daily as needed for cough.  Qty: 30 capsule, Refills: 0       !! - Potential duplicate medications found. Please discuss with provider.

## 2025-04-10 ENCOUNTER — NURSE TRIAGE (OUTPATIENT)
Dept: INTERNAL MEDICINE CLINIC | Facility: CLINIC | Age: 50
End: 2025-04-10

## 2025-04-10 NOTE — TELEPHONE ENCOUNTER
Patient scheduled appointment on MyChart.     Appointment for: You Ochoa ([id number])  Visit type: MYCHART EXAM (2964)  4/14/2025 1:45 PM (15 minutes) with Alvin Rowe in [facility]-INTERNAL MED    Patient comments:  I'm having chest pains do I wanted to get it checked out

## 2025-04-10 NOTE — TELEPHONE ENCOUNTER
Left  detailed voice message that if  having chest pain to call 911 or go to emergency room at once  Office phone number provided with office telephone hours.  Sent [employer] message as well  First attempt

## 2025-04-11 ENCOUNTER — OFFICE VISIT (OUTPATIENT)
Dept: INTERNAL MEDICINE CLINIC | Facility: CLINIC | Age: 50
End: 2025-04-11
Payer: MEDICARE

## 2025-04-11 VITALS
DIASTOLIC BLOOD PRESSURE: 70 MMHG | BODY MASS INDEX: 23.66 KG/M2 | TEMPERATURE: 98 F | HEART RATE: 81 BPM | SYSTOLIC BLOOD PRESSURE: 90 MMHG | OXYGEN SATURATION: 98 % | HEIGHT: 71 IN | WEIGHT: 169 LBS

## 2025-04-11 DIAGNOSIS — R07.89 ATYPICAL CHEST PAIN: Primary | ICD-10-CM

## 2025-04-11 LAB
ATRIAL RATE: 71 BPM
P AXIS: 73 DEGREES
P-R INTERVAL: 132 MS
Q-T INTERVAL: 382 MS
QRS DURATION: 78 MS
QTC CALCULATION (BEZET): 415 MS
R AXIS: -50 DEGREES
T AXIS: 50 DEGREES
VENTRICULAR RATE: 71 BPM

## 2025-04-11 PROCEDURE — 3078F DIAST BP <80 MM HG: CPT | Performed by: INTERNAL MEDICINE

## 2025-04-11 PROCEDURE — 3008F BODY MASS INDEX DOCD: CPT | Performed by: INTERNAL MEDICINE

## 2025-04-11 PROCEDURE — 3074F SYST BP LT 130 MM HG: CPT | Performed by: INTERNAL MEDICINE

## 2025-04-11 PROCEDURE — 93000 ELECTROCARDIOGRAM COMPLETE: CPT | Performed by: INTERNAL MEDICINE

## 2025-04-11 PROCEDURE — 99214 OFFICE O/P EST MOD 30 MIN: CPT | Performed by: INTERNAL MEDICINE

## 2025-04-11 NOTE — PROGRESS NOTES
HPI:    Patient ID: You Ochoa is a 49 year old male.    Chest Pain   Pertinent negatives include no back pain, headaches, nausea or shortness of breath.   patient is here today due to 2 episodes of left side upper chest pain , achy , sharp with inspiration , once on Mon and once on /Tues.  Lasting 10 sec, not associated with shortness of breath , light headedness, diaphoresis , jaw or arm pain .   No nausea.   He had been doing yard work etc. Over the weekend.  He had a normal EKG in Jan . Normal stress test in 2017 .  Normal lipids in Sept . No diabetes, no FH No HTN.   He does smoke and has cut down . He was given an order for a heart scan but hasn't had it yet.  His pain occurred without exertion .     Review of Systems   Constitutional: Negative.    HENT: Negative.     Eyes: Negative.    Respiratory: Negative.  Negative for shortness of breath.    Cardiovascular:  Positive for chest pain. Negative for leg swelling.   Gastrointestinal: Negative.  Negative for nausea.   Endocrine: Negative.    Genitourinary:  Negative for decreased urine volume, difficulty urinating, dysuria, flank pain, frequency, hematuria and urgency.   Musculoskeletal:  Negative for arthralgias, back pain, gait problem, joint swelling and myalgias.   Skin:  Negative for color change, rash and wound.   Allergic/Immunologic: Negative.    Neurological: Negative.  Negative for light-headedness and headaches.   Psychiatric/Behavioral:  Negative for agitation and confusion. The patient is not nervous/anxious.           Current Medications[1]  Allergies:Allergies[2]   PHYSICAL EXAM:   BP 90/70   Pulse 81   Temp 98.2 °F (36.8 °C)   Ht 5' 11\" (1.803 m)   Wt 169 lb (76.7 kg)   SpO2 98%   BMI 23.57 kg/m²      Physical Exam  Constitutional:       General: He is not in acute distress.     Appearance: Normal appearance. He is well-developed. He is not diaphoretic.   HENT:      Right Ear: External ear normal.      Left Ear: External ear normal.       Nose: Nose normal.      Mouth/Throat:      Pharynx: No oropharyngeal exudate.   Eyes:      General: No scleral icterus.        Right eye: No discharge.         Left eye: No discharge.      Conjunctiva/sclera: Conjunctivae normal.      Pupils: Pupils are equal, round, and reactive to light.   Neck:      Thyroid: No thyromegaly.      Vascular: No JVD.      Trachea: No tracheal deviation.   Cardiovascular:      Rate and Rhythm: Normal rate and regular rhythm.      Heart sounds: Normal heart sounds. No murmur heard.     No friction rub. No gallop.   Pulmonary:      Effort: Pulmonary effort is normal. No respiratory distress.      Breath sounds: Normal breath sounds. No stridor. No wheezing, rhonchi or rales.   Chest:      Chest wall: No tenderness.   Abdominal:      General: Bowel sounds are normal. There is no distension.      Palpations: Abdomen is soft. There is no mass.      Tenderness: There is no abdominal tenderness. There is no guarding.   Musculoskeletal:         General: No tenderness.      Cervical back: Normal range of motion and neck supple.   Lymphadenopathy:      Cervical: No cervical adenopathy.   Skin:     General: Skin is warm and dry.      Coloration: Skin is not pale.      Findings: No erythema or rash.   Neurological:      Mental Status: He is alert and oriented to person, place, and time.      Cranial Nerves: No cranial nerve deficit.      Motor: No abnormal muscle tone.      Coordination: Coordination normal.      Deep Tendon Reflexes: Reflexes normal.   Psychiatric:         Behavior: Behavior normal.         Thought Content: Thought content normal.         Judgment: Judgment normal.                ASSESSMENT/PLAN:   No problem-specific Assessment & Plan notes found for this encounter.      No orders of the defined types were placed in this encounter.      Meds This Visit:  Requested Prescriptions      No prescriptions requested or ordered in this encounter       Imaging &  Referrals:  ELECTROCARDIOGRAM, COMPLETE       ID#[id number]       [1]   Current Outpatient Medications   Medication Sig Dispense Refill    naproxen 500 MG Oral Tab Take 1 tablet (500 mg total) by mouth 2 (two) times daily as needed (pain). 30 tablet 0    albuterol (PROAIR HFA) 108 (90 Base) MCG/ACT Inhalation Aero Soln Inhale 2 puffs into the lungs every 6 (six) hours as needed for Wheezing or Shortness of Breath. 3 each 9    Budesonide-Formoterol Fumarate (SYMBICORT) 160-4.5 MCG/ACT Inhalation Aerosol Inhale 2 puffs into the lungs 2 (two) times daily. 3 each 9    ipratropium-albuterol 0.5-2.5 (3) MG/3ML Inhalation Solution Take 3 mL by nebulization 3 (three) times daily as needed (cough, wheeze, shortness of breath.). 90 each 9   [2] No Known Allergies

## 2025-04-11 NOTE — TELEPHONE ENCOUNTER
Action Requested: Summary for Provider     []  Critical Lab, Recommendations Needed  [] Need Additional Advice  [x]   FYI    []   Need Orders  [] Need Medications Sent to Pharmacy  []  Other     SUMMARY: Per protocol, patient should be seen in the office today. [facility] rescheduled for today instead.    Future Appointments   Date Time Provider Department Center   4/11/2025  1:30 PM Александр Collins MD [facility]      Reason for call: Chest Pain  Onset: 4/8    Patient reports of chest pain that happened two days this week, Tuesday and Wednesday. States pain feels like pressure in the upper left side of the chest. First time it lasted for 10 seconds, the next day for 5 seconds. Denies any other associated symptoms such as shortness of breath or difficulty of breathing, or any other pain. States it has not happened since then.    States spouse is a nurse and he was advised to call the office to get an appointment. He scheduled an appointment through "[employer] for evaluation. Advised that if he started experiencing chest pain again, he should go to ER. Care advice given per protocol. Patient verbalized understanding and agreed with plan of care.     Reason for Disposition   Patient wants to be seen    Protocols used: Chest Pain-A-[place]     Will hold on final Rx until patient cleared from [facility].

## 2025-04-11 NOTE — ASSESSMENT & PLAN NOTE
EKG in Jan was normal  Lipids are normal  NO diabetes  NO hypertension   No family history   He does smoke  He was given an order for a Heart scan in Sept , he hasn't gone   Information was given again   EKG here in the office today .

## 2025-05-20 ENCOUNTER — TELEPHONE (OUTPATIENT)
Dept: INTERNAL MEDICINE CLINIC | Facility: CLINIC | Age: 50
End: 2025-05-20

## 2025-06-05 ENCOUNTER — OFFICE VISIT (OUTPATIENT)
Dept: INTERNAL MEDICINE CLINIC | Facility: CLINIC | Age: 50
End: 2025-06-05
Payer: MEDICARE

## 2025-06-05 ENCOUNTER — LAB ENCOUNTER (OUTPATIENT)
Dept: LAB | Age: 50
End: 2025-06-05
Attending: STUDENT IN AN ORGANIZED HEALTH CARE EDUCATION/TRAINING PROGRAM
Payer: MEDICARE

## 2025-06-05 VITALS
SYSTOLIC BLOOD PRESSURE: 98 MMHG | HEIGHT: 71 IN | HEART RATE: 79 BPM | BODY MASS INDEX: 23.66 KG/M2 | DIASTOLIC BLOOD PRESSURE: 62 MMHG | WEIGHT: 169 LBS

## 2025-06-05 DIAGNOSIS — Z12.5 SCREENING FOR PROSTATE CANCER: ICD-10-CM

## 2025-06-05 DIAGNOSIS — Z00.00 ANNUAL PHYSICAL EXAM: ICD-10-CM

## 2025-06-05 DIAGNOSIS — R73.03 PRE-DIABETES: ICD-10-CM

## 2025-06-05 DIAGNOSIS — R07.89 ATYPICAL CHEST PAIN: ICD-10-CM

## 2025-06-05 DIAGNOSIS — N40.0 BENIGN PROSTATIC HYPERPLASIA WITHOUT LOWER URINARY TRACT SYMPTOMS: ICD-10-CM

## 2025-06-05 DIAGNOSIS — E55.9 VITAMIN D DEFICIENCY: ICD-10-CM

## 2025-06-05 DIAGNOSIS — Z13.6 ENCOUNTER FOR SCREENING FOR CORONARY ARTERY DISEASE: ICD-10-CM

## 2025-06-05 DIAGNOSIS — J44.9 STAGE 4 VERY SEVERE COPD BY GOLD CLASSIFICATION (HCC): ICD-10-CM

## 2025-06-05 DIAGNOSIS — F41.1 GAD (GENERALIZED ANXIETY DISORDER): ICD-10-CM

## 2025-06-05 DIAGNOSIS — Z00.00 ENCOUNTER FOR MEDICARE ANNUAL WELLNESS EXAM: Primary | ICD-10-CM

## 2025-06-05 LAB
ALBUMIN SERPL-MCNC: 4.6 G/DL (ref 3.2–4.8)
ALBUMIN/GLOB SERPL: 1.6 {RATIO} (ref 1–2)
ALP LIVER SERPL-CCNC: 40 U/L (ref 45–117)
ALT SERPL-CCNC: 14 U/L (ref 10–49)
ANION GAP SERPL CALC-SCNC: 6 MMOL/L (ref 0–18)
AST SERPL-CCNC: 22 U/L (ref ?–34)
BASOPHILS # BLD AUTO: 0.03 X10(3) UL (ref 0–0.2)
BASOPHILS NFR BLD AUTO: 0.6 %
BILIRUB SERPL-MCNC: 0.4 MG/DL (ref 0.3–1.2)
BUN BLD-MCNC: 10 MG/DL (ref 9–23)
BUN/CREAT SERPL: 7.1 (ref 10–20)
CALCIUM BLD-MCNC: 9.8 MG/DL (ref 8.7–10.4)
CHLORIDE SERPL-SCNC: 105 MMOL/L (ref 98–112)
CHOLEST SERPL-MCNC: 186 MG/DL (ref ?–200)
CO2 SERPL-SCNC: 30 MMOL/L (ref 21–32)
COMPLEXED PSA SERPL-MCNC: 0.29 NG/ML (ref ?–4)
CREAT BLD-MCNC: 1.4 MG/DL (ref 0.7–1.3)
DEPRECATED RDW RBC AUTO: 43.8 FL (ref 35.1–46.3)
EGFRCR SERPLBLD CKD-EPI 2021: 62 ML/MIN/1.73M2 (ref 60–?)
EOSINOPHIL # BLD AUTO: 0.14 X10(3) UL (ref 0–0.7)
EOSINOPHIL NFR BLD AUTO: 2.7 %
ERYTHROCYTE [DISTWIDTH] IN BLOOD BY AUTOMATED COUNT: 13.2 % (ref 11–15)
EST. AVERAGE GLUCOSE BLD GHB EST-MCNC: 134 MG/DL (ref 68–126)
FASTING PATIENT LIPID ANSWER: NO
FASTING STATUS PATIENT QL REPORTED: NO
GLOBULIN PLAS-MCNC: 2.8 G/DL (ref 2–3.5)
GLUCOSE BLD-MCNC: 104 MG/DL (ref 70–99)
HBA1C MFR BLD: 6.3 % (ref ?–5.7)
HCT VFR BLD AUTO: 48.2 % (ref 39–53)
HDLC SERPL-MCNC: 57 MG/DL (ref 40–59)
HGB BLD-MCNC: 16 G/DL (ref 13–17.5)
IMM GRANULOCYTES # BLD AUTO: 0 X10(3) UL (ref 0–1)
IMM GRANULOCYTES NFR BLD: 0 %
LDLC SERPL CALC-MCNC: 116 MG/DL (ref ?–100)
LYMPHOCYTES # BLD AUTO: 2.13 X10(3) UL (ref 1–4)
LYMPHOCYTES NFR BLD AUTO: 41.4 %
MCH RBC QN AUTO: 29.7 PG (ref 26–34)
MCHC RBC AUTO-ENTMCNC: 33.2 G/DL (ref 31–37)
MCV RBC AUTO: 89.6 FL (ref 80–100)
MONOCYTES # BLD AUTO: 0.48 X10(3) UL (ref 0.1–1)
MONOCYTES NFR BLD AUTO: 9.3 %
NEUTROPHILS # BLD AUTO: 2.37 X10 (3) UL (ref 1.5–7.7)
NEUTROPHILS # BLD AUTO: 2.37 X10(3) UL (ref 1.5–7.7)
NEUTROPHILS NFR BLD AUTO: 46 %
NONHDLC SERPL-MCNC: 129 MG/DL (ref ?–130)
OSMOLALITY SERPL CALC.SUM OF ELEC: 291 MOSM/KG (ref 275–295)
PLATELET # BLD AUTO: 162 10(3)UL (ref 150–450)
POTASSIUM SERPL-SCNC: 4.2 MMOL/L (ref 3.5–5.1)
PROT SERPL-MCNC: 7.4 G/DL (ref 5.7–8.2)
RBC # BLD AUTO: 5.38 X10(6)UL (ref 4.3–5.7)
SODIUM SERPL-SCNC: 141 MMOL/L (ref 136–145)
TRIGL SERPL-MCNC: 70 MG/DL (ref 30–149)
TSI SER-ACNC: 2.18 UIU/ML (ref 0.55–4.78)
VIT D+METAB SERPL-MCNC: 25.1 NG/ML (ref 30–100)
VLDLC SERPL CALC-MCNC: 12 MG/DL (ref 0–30)
WBC # BLD AUTO: 5.2 X10(3) UL (ref 4–11)

## 2025-06-05 PROCEDURE — 84443 ASSAY THYROID STIM HORMONE: CPT

## 2025-06-05 PROCEDURE — 83036 HEMOGLOBIN GLYCOSYLATED A1C: CPT

## 2025-06-05 PROCEDURE — 82306 VITAMIN D 25 HYDROXY: CPT

## 2025-06-05 PROCEDURE — 80061 LIPID PANEL: CPT

## 2025-06-05 PROCEDURE — 80053 COMPREHEN METABOLIC PANEL: CPT

## 2025-06-05 PROCEDURE — 85025 COMPLETE CBC W/AUTO DIFF WBC: CPT

## 2025-06-05 PROCEDURE — 36415 COLL VENOUS BLD VENIPUNCTURE: CPT

## 2025-06-05 RX ORDER — IPRATROPIUM BROMIDE AND ALBUTEROL SULFATE 2.5; .5 MG/3ML; MG/3ML
3 SOLUTION RESPIRATORY (INHALATION) 3 TIMES DAILY PRN
Qty: 1008 UNDEFINED | Refills: 9 | Status: SHIPPED | OUTPATIENT
Start: 2025-06-05

## 2025-06-05 RX ORDER — ALBUTEROL SULFATE 90 UG/1
2 INHALANT RESPIRATORY (INHALATION) EVERY 6 HOURS PRN
Qty: 51 UNDEFINED | Refills: 9 | Status: SHIPPED | OUTPATIENT
Start: 2025-06-05

## 2025-06-05 RX ORDER — BUDESONIDE AND FORMOTEROL FUMARATE DIHYDRATE 160; 4.5 UG/1; UG/1
2 AEROSOL RESPIRATORY (INHALATION) 2 TIMES DAILY
Qty: 30.6 UNDEFINED | Refills: 9 | Status: SHIPPED | OUTPATIENT
Start: 2025-06-05

## 2025-06-05 NOTE — PROGRESS NOTES
Subjective:   You Ochoa is a 49 year old male who presents for a MA AHA (Medicare Advantage Annual Health Assessment) and Medicare Subsequent Annual Wellness visit (Pt already had Initial Annual Wellness) and scheduled follow up of multiple significant but stable problems and acute complicated new problem.   History of Present Illness  You Ochoa is a 49 year old male with stage three very severe COPD who presents to [facility] care with a new internal medicine physician.    He has stage three very severe COPD and quit smoking approximately seven months ago after a brief relapse due to personal stressors. He uses Symbicort, albuterol, and Duoneb as needed for COPD management. There have been no recent exacerbations or hospitalizations related to COPD.    He has a history of general anxiety disorder but prefers not to take medication, managing it through other means. No recent episodes of severe anxiety reported.    He experienced atypical chest pain in the past, with an EKG in April showing left axis deviation. An ultrasound in March 2022 showed mildly reduced pump function. He had a normal Holter monitor study in March 2022. He has not experienced chest pain recently and has not seen a cardiologist.    He is prediabetic with an A1c of 6.1 eight months ago, up from 5.9 two years prior. He has not started any medication for this condition.    He has a family history of colon cancer; his maternal grandmother passed away from it at the age of 56. He underwent a colonoscopy on June 13, 2022, which was normal.    He has a history of a right ankle fracture.    History/Other:   Fall Risk Assessment:   He has been screened for Falls and is High Risk. Fall Prevention information provided to patient in After Visit Summary.    Do you feel unsteady when standing or walking?: (Patient-Rptd) Yes  Do you worry about falling?: (Patient-Rptd) No  Have you fallen in the past year?: (Patient-Rptd) No     Cognitive  Assessment:   He had a completely normal cognitive assessment - see flowsheet entries     Functional Ability/Status:   You Ochoa has some abnormal functions as listed below:  He has difficulties Shopping for Groceries based on screening of functional status. He has Walking problems based on screening of functional status.       Depression Screening (PHQ):  PHQ-2 SCORE: 0  , done 6/5/2025        <5 minutes spent screening and counseling for depression    Advanced Directives:   He does NOT have a Living Will. [Do you have a living will?: (Patient-Rptd) No]  He does NOT have a Power of  for Health Care. [Do you have a healthcare power of ?: (Patient-Rptd) No]  Discussed Advance Care Planning with patient (and family/surrogate if present). Standard forms made available to patient in After Visit Summary.      Problem List[1]  Allergies:  He has no known allergies.    Current Medications:  Active Meds, Sig Only[2]    Medical History:  He  has a past medical history of Anxiety, Arrhythmia, Asthma (HCC), Esophageal reflux, and Osteoarthritis.  Surgical History:  He  has a past surgical history that includes other surgical history (2020); colonoscopy (N/A, 6/13/2022); and ankle fracture surgery (Right, 02/2022).   Family History:  His family history includes Cancer (age of onset: 56) in his maternal grandmother; Diabetes in his father and mother; No Known Problems in his sister.  Social History:  He  reports that he quit smoking about 6 months ago. His smoking use included cigarettes. He started smoking about 25 years ago. He has a 24.9 pack-year smoking history. He has been exposed to tobacco smoke. He has never used smokeless tobacco. He reports current alcohol use. He reports current drug use. Drug: Cannabis.    Tobacco:  He smoked tobacco in the past but quit greater than 12 months ago.  Tobacco Use[3]     CAGE Alcohol Screen:   CAGE screening score of 0 on 6/4/2025, showing low risk of alcohol  abuse.      Patient Care Team:  Alvin Rowe MD as PCP - General (Internal Medicine)  Shay Godoy PT as Physical Therapist (Physical Therapy)    Review of Systems   Constitutional: Negative.    Respiratory: Negative.     Cardiovascular:  Positive for chest pain.   Gastrointestinal: Negative.    Skin: Negative.    Neurological: Negative.           Objective:   Physical Exam  Constitutional:       Appearance: He is well-developed.   Cardiovascular:      Rate and Rhythm: Normal rate and regular rhythm.      Heart sounds: Normal heart sounds.   Pulmonary:      Effort: Pulmonary effort is normal.      Breath sounds: Normal breath sounds.   Abdominal:      General: Bowel sounds are normal.      Palpations: Abdomen is soft.   Skin:     General: Skin is warm and dry.   Neurological:      Mental Status: He is alert and oriented to person, place, and time.      Deep Tendon Reflexes: Reflexes are normal and symmetric.          BP 98/62 (BP Location: Right arm, Patient Position: Sitting, Cuff Size: large)   Pulse 79   Ht 5' 11\" (1.803 m)   Wt 169 lb (76.7 kg)   BMI 23.57 kg/m²  Estimated body mass index is 23.57 kg/m² as calculated from the following:    Height as of this encounter: 5' 11\" (1.803 m).    Weight as of this encounter: 169 lb (76.7 kg).    Medicare Hearing Assessment:   Hearing Screening    Screening Method: Questionnaire  I have a problem hearing over the telephone: No I have trouble following the conversations when two or more people are talking at the same time: No   I have trouble understanding things on the TV: No I have to strain to understand conversations: No   I have to worry about missing the telephone ring or doorbell: No I have trouble hearing conversations in a noisy background such as a crowded room or restaurant: No   I get confused about where sounds come from: No I misunderstand some words in a sentence and need to ask people to repeat themselves: No   I especially have trouble  understanding the speech of women and children: No I have trouble understanding the speaker in a large room such as at a meeting or place of [employer]: No   Many people I talk to seem to mumble (or don't speak clearly): No People get annoyed because I misunderstand what they say: No   I misunderstand what others are saying and make inappropriate responses: No I avoid social activities because I cannot hear well and fear I will reply improperly: No   Family members and friends have told me they think I may have hearing loss: No             Visual Acuity:   Right Eye Visual Acuity: Uncorrected Right Eye Chart Acuity: 20/20   Left Eye Visual Acuity: Uncorrected Left Eye Chart Acuity: 20/20   Both Eyes Visual Acuity: Uncorrected Both Eyes Chart Acuity: 20/20   Able To Tolerate Visual Acuity: Yes        Assessment & Plan:   You Ochoa is a 49 year old male who presents for a Medicare Assessment.     1. Encounter for Medicare annual wellness exam (Primary)  -     EKG 12 Lead; Future; Expected date: 06/05/2025  -     PSA Total, Screen; Future; Expected date: 06/05/2025  -     Lipid Panel; Future; Expected date: 06/05/2025  -     TSH W Reflex To Free T4; Future; Expected date: 06/05/2025  -     Hemoglobin A1C; Future; Expected date: 06/05/2025  -     Comp Metabolic Panel (14); Future; Expected date: 06/05/2025  -     CBC With Differential With Platelet; Future; Expected date: 06/05/2025  -     Vitamin D; Future; Expected date: 06/05/2025  Patient here for physical exam and due for following.  Plan:  -order the following Labs: CBC, CMP, Lipid Panel, A1C, TSH, Vitamin D,  PSA in males.   -Discussed benefit for Screening for Cardiac CT scan for evaluation of cardiac arthrosclerosis, ordered Calcium CT scan, should be repeated every 3 years. If abnormal will refer to cardiology.       -I recommend to eat a more balanced mediterranean diet (eat more vegetables and fruits) more omega 3 fatty acids, lean protein (chicken,  turkey, seafood), less process/fried fatty foods, less red met, and mixed aerobic exercise 30 minutes a day and intermittent strength training.        2. Stage 4 very severe COPD by GOLD classification (HCC)  -     Budesonide-Formoterol Fumarate; Inhale 2 puffs into the lungs 2 (two) times daily.  Dispense: 30.6 Undefined; Refill: 9  -     Albuterol Sulfate HFA; Inhale 2 puffs into the lungs every 6 (six) hours as needed for Wheezing or Shortness of Breath.  Dispense: 51 Undefined; Refill: 9  -     Ipratropium-Albuterol; Take 3 mL by nebulization 3 (three) times daily as needed (cough, wheeze, shortness of breath.).  Dispense: 1008 Undefined; Refill: 9  Chronic Obstructive Pulmonary Disease (COPD)  Stage three very severe COPD. Continued inhaler use essential for symptom management and exacerbation prevention.  - Continue Symbicort, albuterol, and Duoneb as needed.  - Send prescriptions through mail order pharmacy for three-month supply.  - Contact [facility] pharmacy to arrange medication delivery.    3. Pre-diabetes  -     Hemoglobin A1C; Future; Expected date: 06/05/2025  Prediabetes  Prediabetes with A1c of 6.1. Emphasized lifestyle modifications to prevent diabetes progression.  - Encourage regular exercise.  - Advise dietary modifications including increased intake of vegetables, fruits, chicken, turkey, and seafood, and reduced intake of red meat and pork.    4. KARL (generalized anxiety disorder)  Plan  Chronic, well controlled on current intervention, denies adverse effects, continue with present management.    5. Atypical chest pain  -     EKG 12 Lead; Future; Expected date: 06/05/2025  -     CT CALCIUM SCORING; Future; Expected date: 06/05/2025  -     CARD ECHO 2D DOPPLER (CPT=93306); Future; Expected date: 06/05/2025  -     CARD ECHO STRESS ECHO/REST AND STRESS(CPT=93350/47402 DMG 62175); Future; Expected date: 06/05/2025  -     [facility] CARDIOLOGY EXTERNAL  Atypical Chest Pain  Atypical chest  pain with previous abnormal EKG and reduced ejection fraction. Recent EKG normal. Further cardiac evaluation planned.  - Order stress echocardiogram.  Repeat EKG  -order Stress Echo  - Refer to cardiologist if stress echo is positive or concerning.    6. Encounter for screening for coronary artery disease  -     EKG 12 Lead; Future; Expected date: 06/05/2025  -     CT CALCIUM SCORING; Future; Expected date: 06/05/2025  -     CARD ECHO 2D DOPPLER (CPT=93306); Future; Expected date: 06/05/2025  -     CARD ECHO STRESS ECHO/REST AND STRESS(CPT=93350/98640 DMG 60621); Future; Expected date: 06/05/2025  -     [facility] CARDIOLOGY EXTERNAL  Atypical Chest Pain  Atypical chest pain with previous abnormal EKG and reduced ejection fraction. Recent EKG normal. Further cardiac evaluation planned.  - Order stress echocardiogram.  Repeat EKG  -order Stress Echo  - Refer to cardiologist if stress echo is positive or concerning.    7. Annual physical exam  -     PSA Total, Screen; Future; Expected date: 06/05/2025  -     Lipid Panel; Future; Expected date: 06/05/2025  -     TSH W Reflex To Free T4; Future; Expected date: 06/05/2025  -     Hemoglobin A1C; Future; Expected date: 06/05/2025  -     Comp Metabolic Panel (14); Future; Expected date: 06/05/2025  -     CBC With Differential With Platelet; Future; Expected date: 06/05/2025  -     Vitamin D; Future; Expected date: 06/05/2025  8. Screening for prostate cancer  -     PSA Total, Screen; Future; Expected date: 06/05/2025  Check screening PSA for prostate cancer screening  -if abnormal notified pt will repeat, and/or if experiencing any urinary issues or abnormal scrotal mass will refer to urology for evaluation.     9. Vitamin D deficiency  -     Vitamin D; Future; Expected date: 06/05/2025  Pt with Vitamin D def due for screening:  Plan;  -Check vitamin D level, depending on level will give guidance on what dose to recommend per guidelines.      10. Benign prostatic  hyperplasia without lower urinary tract symptoms  -     PSA Total, Screen; Future; Expected date: 06/05/2025  Check screening PSA for prostate cancer screening  -if abnormal notified pt will repeat, and/or if experiencing any urinary issues or abnormal scrotal mass will refer to urology for evaluation.   Assessment & Plan    The patient indicates understanding of these issues and agrees to the plan.  Reinforced healthy diet, lifestyle, and exercise.      No follow-ups on file.     Alvin Rowe MD, 6/5/2025     Supplementary Documentation:   General Health:  In the past six months, have you lost more than 10 pounds without trying?: (Patient-Rptd) 2 - No  Has your appetite been poor?: (Patient-Rptd) No  Type of Diet: (Patient-Rptd) Balanced  How does the patient maintain a good energy level?: (Patient-Rptd) Stretching, Other  How would you describe your daily physical activity?: (Patient-Rptd) Light  How would you describe your current health state?: (Patient-Rptd) Fair  How do you maintain positive mental well-being?: (Patient-Rptd) Social Interaction, Games, Visiting Family  On a scale of 0 to 10, with 0 being no pain and 10 being severe pain, what is your pain level?: (Patient-Rptd) 3 - (Mild)  In the past six months, have you experienced urine leakage?: (Patient-Rptd) 0-No  At any time do you feel concerned for the safety/well-being of yourself and/or your children, in your home or elsewhere?: (Patient-Rptd) No  Have you had any immunizations at another office such as Influenza, Hepatitis B, Tetanus, or Pneumococcal?: (Patient-Rptd) No    Health Maintenance   Topic Date Due    Annual Well Visit  01/01/2025    COVID-19 Vaccine (4 - 2024-25 season) 07/05/2025 (Originally 9/1/2024)    Influenza Vaccine (Season Ended) 10/01/2025    Colorectal Cancer Screening  06/13/2032    DTaP,Tdap,and Td Vaccines (2 - Td or Tdap) 03/08/2033    Pneumococcal Vaccine: Birth to 50yrs  Completed    Meningococcal B Vaccine  Aged Out     Annual Depression Screening  Discontinued            [1]   Patient Active Problem List  Diagnosis    Family history of colon cancer    Stage 4 very severe COPD by GOLD classification (HCC)    H/O fracture of ankle    KARL (generalized anxiety disorder)    Ankle instability, right    Atypical chest pain   [2]   Outpatient Medications Marked as Taking for the 25 encounter (Office Visit) with Alvin Rowe MD   Medication Sig    Budesonide-Formoterol Fumarate (SYMBICORT) 160-4.5 MCG/ACT Inhalation Aerosol Inhale 2 puffs into the lungs 2 (two) times daily.    albuterol (PROAIR HFA) 108 (90 Base) MCG/ACT Inhalation Aero Soln Inhale 2 puffs into the lungs every 6 (six) hours as needed for Wheezing or Shortness of Breath.    ipratropium-albuterol 0.5-2.5 (3) MG/3ML Inhalation Solution Take 3 mL by nebulization 3 (three) times daily as needed (cough, wheeze, shortness of breath.).    naproxen 500 MG Oral Tab Take 1 tablet (500 mg total) by mouth 2 (two) times daily as needed (pain).   [3]   Social History  Tobacco Use   Smoking Status Former    Current packs/day: 0.00    Average packs/day: 1 pack/day for 24.9 years (24.9 ttl pk-yrs)    Types: Cigarettes    Start date: 2000    Quit date: 2024    Years since quittin.5    Passive exposure: Past   Smokeless Tobacco Never   Tobacco Comments    Smokes less than half a pack    Last cigarette was 25

## 2025-06-06 NOTE — PROGRESS NOTES
Please relay to patient if not read:     Hello There!     Dr. Rowe here, I have reviewed your labs here are your recommendations:      # Lipids/cholesterol: Your ASCVD, ie 10-year risk score which indicates the potential chance that you could have a heart attack, stroke or death related to cholesterol/heart disease, is LOWER than the 5% cut off, thus cholesterol medications, ie statins, are not required to be started at this time, unless you would like to do so to prevent heart disease from starting. Please proceed with the heart scan/calcium score. We will monitor your cholesterols yearly with your annual. Please continue with exercise and mediterranean/low carb diet.      The 10-year ASCVD risk score (Gil TURCIOS, et al., 2019) is: 2.7%    Values used to calculate the score:      Age: 49 years      Sex: Male      Is Non- : Yes      Diabetic: No      Tobacco smoker: No      Systolic Blood Pressure: 98 mmHg      Is BP treated: No      HDL Cholesterol: 57 mg/dL      Total Cholesterol: 186 mg/dL    # Diabetes/A1C: Your A1C Last A1c value was 6.3% done 6/5/2025.   which shows  prediabetes. This does not require medications unless your A1C reaches greater than 6.5, but if you would like to start medications to prevent the progression to diabetes please let me know. I would recommend increasing exercise and/or reducing your intake of carbohydrates, pastas, sweets, and sugary drinks/etc, drink more water, eat more vegetables instead of fruit, and try to lose 10% of your current bodyweight.  We will recheck your A1C in 3 months, please schedule a follow up office visit so we can recheck your A1C in the office using a finger stick test. You do not need to fast. If you prefer a video visit let me know so we can order a lab draw A1C/metabolic panel to be completed 3 days prior to our visit.     # TSH: Your thyroid (TSH) function is normal.     # CMP: Your comprehensive metabolic panel shows overall  stable functioning kidneys (creatinine, GFR), liver (AST, ALT, Bilirubin), and electrolytes (sodium, potassium, calcium). Slight variations in other values such as BUN/Creat, Serum Osm, anion gap, chloride, etc are not of clinical value at this time.     # CBC: Your complete blood count shows overall stable red blood cells, white blood cells, platelets (help you stop bleeding), and hematocrit (thickness of blood),  Slight variations in other values such as RDW/sw, MCH are not of clinical value at this time.       # Vitamins: Your vitamin D level is Vitamin D Insufficiency (<30ng/mL) please start 2,000 International Units daily (it is cheaper to purchase from [employer] or your local pharmacy rather than sending a prescription in). will recheck with next annual physical or sooner if clinically indicated      # Prostate Cancer Screening: Your Prostate Cancer Screening (PSA) Is normal, will repeat next year or sooner if you develop urinary frequency symptoms at night        If you have any questions or concerns in regards to these labs please schedule a follow up and will gladly discuss.   -Dr. Rowe

## 2025-06-11 DIAGNOSIS — J44.9 STAGE 4 VERY SEVERE COPD BY GOLD CLASSIFICATION (HCC): ICD-10-CM

## 2025-06-12 ENCOUNTER — HOSPITAL ENCOUNTER (OUTPATIENT)
Dept: CV DIAGNOSTICS | Facility: HOSPITAL | Age: 50
Discharge: HOME | End: 2025-06-12
Attending: STUDENT IN AN ORGANIZED HEALTH CARE EDUCATION/TRAINING PROGRAM
Payer: MEDICARE

## 2025-06-12 DIAGNOSIS — R07.89 ATYPICAL CHEST PAIN: ICD-10-CM

## 2025-06-12 DIAGNOSIS — Z13.6 ENCOUNTER FOR SCREENING FOR CORONARY ARTERY DISEASE: ICD-10-CM

## 2025-06-12 LAB
% OF MAX PREDICTED HR: 100 %
MAX DIASTOLIC BP: 72 MMHG
MAX HEART RATE: 142 BPM
MAX PREDICTED HEART RATE: 173 BPM
MAX SYSTOLIC BP: 152 MMHG
MAX WORK LOAD: 70

## 2025-06-12 PROCEDURE — 93350 STRESS TTE ONLY: CPT | Performed by: STUDENT IN AN ORGANIZED HEALTH CARE EDUCATION/TRAINING PROGRAM

## 2025-06-12 PROCEDURE — 93017 CV STRESS TEST TRACING ONLY: CPT | Performed by: STUDENT IN AN ORGANIZED HEALTH CARE EDUCATION/TRAINING PROGRAM

## 2025-06-12 PROCEDURE — 93306 TTE W/DOPPLER COMPLETE: CPT | Performed by: STUDENT IN AN ORGANIZED HEALTH CARE EDUCATION/TRAINING PROGRAM

## 2025-06-12 PROCEDURE — 93016 CV STRESS TEST SUPVJ ONLY: CPT | Performed by: INTERNAL MEDICINE

## 2025-06-12 PROCEDURE — 93018 CV STRESS TEST I&R ONLY: CPT | Performed by: INTERNAL MEDICINE

## 2025-06-13 RX ORDER — BUDESONIDE AND FORMOTEROL FUMARATE DIHYDRATE 160; 4.5 UG/1; UG/1
2 AEROSOL RESPIRATORY (INHALATION) 2 TIMES DAILY
Refills: 0 | OUTPATIENT
Start: 2025-06-13

## 2025-06-13 NOTE — PROGRESS NOTES
Please relay to patient if not read: (duplicate report on 3 cardiac results):  Ramila Orta your Heart ultrasound showed mildly increased pulmonary pressure (pulmonary HTN)    Stress Echo: low risk however unable to exclude anterior left ventricle issues.    Please make sure to follow up with Cardiologist at [facility] for further evaluation

## 2025-06-13 NOTE — PROGRESS NOTES
Please relay to patient if not read: (duplicate report on 3 cardiac results):  Ramila Orta your Heart ultrasound showed mildly increased pulmonary pressure (pulmonary HTN)    Stress Echo: low risk however unable to exclude anterior left ventricle issues.    Please make sure to follow up with Cardiologist at [facility] for further evaluation

## 2025-06-13 NOTE — PROGRESS NOTES
Please relay to patient if not read: (duplicate report on 3 cardiac results):  Ramila Orta your Heart ultrasound showed mildly increased pulmonary pressure (pulmonary HTN)    Stress Echo: low risk however unable to exclude anterior left ventricle issues.    Please make sure to follow up with Cardiologist at [facility] for further evaluation

## 2025-06-16 ENCOUNTER — HOSPITAL ENCOUNTER (OUTPATIENT)
Dept: CT IMAGING | Facility: HOSPITAL | Age: 50
Discharge: HOME | End: 2025-06-16
Attending: STUDENT IN AN ORGANIZED HEALTH CARE EDUCATION/TRAINING PROGRAM

## 2025-06-16 DIAGNOSIS — R07.89 ATYPICAL CHEST PAIN: ICD-10-CM

## 2025-06-16 DIAGNOSIS — Z13.6 ENCOUNTER FOR SCREENING FOR CORONARY ARTERY DISEASE: ICD-10-CM

## 2025-06-19 NOTE — PROGRESS NOTES
Please relay to patient if not read:     Your CT Calcium score over-read, which shows the other organs in your chest cavity and is included with your recently competed heart scan is overall normal. It does show some opacities in the lung and COPD, please follow up with your pulmonologist (lung doctor)    The results of the heart scan/calcium score may take up to 2 weeks to be reviewed by the cardiologist. If you were provided with a preliminary score and have questions or concerns, please reach out to my office with your preliminary score in hand.     -Dr. Rowe

## 2025-06-24 NOTE — PROGRESS NOTES
Please relay to patient if not read:     Hello There!    Your Calcium score is 0 indicating no Atherosclerosis (Coronary Artery Disease) fatty/plaque buildup in arteries supplying your heart.     Below is your 10-year risk score which indicates the potential chance that you could have a heart attack, stroke or death related to cholesterol/heart disease.     Your Calcium CT score is 0 and ASCVD risk score is below 5%, thus No statin medication needed.     If you are having any episodes of chest pain, palpitations or shortness of breath on exertion if you have not already scheduled a follow up please do and we can discuss further cardiac workup if warranted (if we have not done so already).     The 10-year ASCVD risk score (Gil TURCIOS, et al., 2019) is: 2.8%    Values used to calculate the score:      Age: 50 years      Sex: Male      Is Non- : Yes      Diabetic: No      Tobacco smoker: No      Systolic Blood Pressure: 98 mmHg      Is BP treated: No      HDL Cholesterol: 57 mg/dL      Total Cholesterol: 186 mg/dL    I recommend to eat a more balanced mediterranean diet (eat more vegetables and fruits) more omega 3 fatty acids, lean protein (chicken, turkey, seafood), less process/fried fatty foods, less red met, and mixed aerobic exercise 30 minutes a day and intermittent strength training.      We will repeat your Calcium CT testing in 3 years    Let me know if you have any questions,you can schedule a follow up and gladly discuss.  -Dr. Rowe

## 2025-07-16 ENCOUNTER — ORDER TRANSCRIPTION (OUTPATIENT)
Dept: ADMINISTRATIVE | Facility: HOSPITAL | Age: 50
End: 2025-07-16

## 2025-07-16 DIAGNOSIS — R07.89 ATYPICAL CHEST PAIN: Primary | ICD-10-CM

## 2025-07-22 ENCOUNTER — HOSPITAL ENCOUNTER (OUTPATIENT)
Dept: CT IMAGING | Facility: HOSPITAL | Age: 50
Discharge: HOME | End: 2025-07-22
Attending: INTERNAL MEDICINE
Payer: MEDICARE

## 2025-07-22 DIAGNOSIS — R91.8 PULMONARY NODULES: ICD-10-CM

## 2025-07-22 PROCEDURE — 71250 CT THORAX DX C-: CPT | Performed by: INTERNAL MEDICINE

## 2025-08-06 RX ORDER — METOPROLOL TARTRATE 50 MG
50 TABLET ORAL AS DIRECTED
COMMUNITY
Start: 2025-07-16

## 2025-08-07 ENCOUNTER — HOSPITAL ENCOUNTER (OUTPATIENT)
Dept: CT IMAGING | Facility: HOSPITAL | Age: 50
Discharge: HOME | End: 2025-08-07
Attending: INTERNAL MEDICINE

## 2025-08-07 VITALS
DIASTOLIC BLOOD PRESSURE: 74 MMHG | RESPIRATION RATE: 14 BRPM | SYSTOLIC BLOOD PRESSURE: 97 MMHG | HEART RATE: 59 BPM | WEIGHT: 169 LBS | HEIGHT: 71 IN | BODY MASS INDEX: 23.66 KG/M2

## 2025-08-07 DIAGNOSIS — R07.89 ATYPICAL CHEST PAIN: ICD-10-CM

## 2025-08-07 LAB
CREAT BLD-MCNC: 1.3 MG/DL (ref 0.7–1.3)
EGFRCR SERPLBLD CKD-EPI 2021: 68 ML/MIN/1.73M2 (ref 60–?)

## 2025-08-07 PROCEDURE — 82565 ASSAY OF CREATININE: CPT

## 2025-08-07 PROCEDURE — 75574 CT ANGIO HRT W/3D IMAGE: CPT | Performed by: INTERNAL MEDICINE

## 2025-08-07 RX ORDER — DILTIAZEM HYDROCHLORIDE 5 MG/ML
5 INJECTION INTRAVENOUS SEE ADMIN INSTRUCTIONS
Status: DISCONTINUED | OUTPATIENT
Start: 2025-08-07 | End: 2025-08-09

## 2025-08-07 RX ORDER — METOPROLOL TARTRATE 25 MG/1
TABLET, FILM COATED ORAL
Status: COMPLETED
Start: 2025-08-07 | End: 2025-08-07

## 2025-08-07 RX ORDER — METOPROLOL TARTRATE 25 MG/1
50 TABLET, FILM COATED ORAL ONCE AS NEEDED
Status: COMPLETED | OUTPATIENT
Start: 2025-08-07 | End: 2025-08-07

## 2025-08-07 RX ORDER — METOPROLOL TARTRATE 1 MG/ML
5 INJECTION, SOLUTION INTRAVENOUS SEE ADMIN INSTRUCTIONS
Status: DISCONTINUED | OUTPATIENT
Start: 2025-08-07 | End: 2025-08-09

## 2025-08-07 RX ORDER — DILTIAZEM HYDROCHLORIDE 5 MG/ML
INJECTION INTRAVENOUS
Status: COMPLETED
Start: 2025-08-07 | End: 2025-08-07

## 2025-08-07 RX ORDER — METOPROLOL TARTRATE 1 MG/ML
INJECTION, SOLUTION INTRAVENOUS
Status: COMPLETED
Start: 2025-08-07 | End: 2025-08-07

## 2025-08-07 RX ORDER — METOPROLOL TARTRATE 25 MG/1
100 TABLET, FILM COATED ORAL ONCE AS NEEDED
Status: COMPLETED | OUTPATIENT
Start: 2025-08-07 | End: 2025-08-07

## 2025-08-07 RX ORDER — NITROGLYCERIN 0.4 MG/1
0.4 TABLET SUBLINGUAL ONCE
Status: COMPLETED | OUTPATIENT
Start: 2025-08-07 | End: 2025-08-07

## 2025-08-07 RX ADMIN — METOPROLOL TARTRATE 5 MG: 1 INJECTION, SOLUTION INTRAVENOUS at 10:11:00

## 2025-08-07 RX ADMIN — NITROGLYCERIN 0.4 MG: 0.4 TABLET SUBLINGUAL at 11:27:00

## 2025-08-07 RX ADMIN — DILTIAZEM HYDROCHLORIDE 5 MG: 5 INJECTION INTRAVENOUS at 10:51:00

## 2025-08-07 RX ADMIN — METOPROLOL TARTRATE 5 MG: 1 INJECTION, SOLUTION INTRAVENOUS at 09:47:00

## 2025-08-07 RX ADMIN — METOPROLOL TARTRATE 50 MG: 25 TABLET, FILM COATED ORAL at 08:58:00

## 2025-08-07 RX ADMIN — METOPROLOL TARTRATE 5 MG: 1 INJECTION, SOLUTION INTRAVENOUS at 09:57:00

## 2025-08-07 RX ADMIN — DILTIAZEM HYDROCHLORIDE 5 MG: 5 INJECTION INTRAVENOUS at 10:35:00

## 2025-08-07 RX ADMIN — METOPROLOL TARTRATE 5 MG: 1 INJECTION, SOLUTION INTRAVENOUS at 10:20:00

## 2025-08-07 RX ADMIN — DILTIAZEM HYDROCHLORIDE 5 MG: 5 INJECTION INTRAVENOUS at 10:46:00

## 2025-08-07 RX ADMIN — METOPROLOL TARTRATE 50 MG: 25 TABLET, FILM COATED ORAL at 08:15:00

## 2025-08-07 RX ADMIN — DILTIAZEM HYDROCHLORIDE 5 MG: 5 INJECTION INTRAVENOUS at 10:26:00

## 2025-08-18 ENCOUNTER — TELEPHONE (OUTPATIENT)
Dept: INTERNAL MEDICINE CLINIC | Facility: CLINIC | Age: 50
End: 2025-08-18

## 2025-08-18 DIAGNOSIS — J44.9 STAGE 4 VERY SEVERE COPD BY GOLD CLASSIFICATION (HCC): Primary | ICD-10-CM

## 2025-08-18 DIAGNOSIS — R91.1 LUNG NODULE: ICD-10-CM

## 2025-08-19 ENCOUNTER — OFFICE VISIT (OUTPATIENT)
Dept: PULMONOLOGY | Facility: CLINIC | Age: 50
End: 2025-08-19

## 2025-08-19 VITALS
RESPIRATION RATE: 16 BRPM | WEIGHT: 165 LBS | DIASTOLIC BLOOD PRESSURE: 61 MMHG | HEART RATE: 58 BPM | OXYGEN SATURATION: 93 % | HEIGHT: 71 IN | BODY MASS INDEX: 23.1 KG/M2 | SYSTOLIC BLOOD PRESSURE: 95 MMHG

## 2025-08-19 DIAGNOSIS — R91.8 LUNG NODULES: Primary | ICD-10-CM

## 2025-08-19 PROCEDURE — 3008F BODY MASS INDEX DOCD: CPT | Performed by: INTERNAL MEDICINE

## 2025-08-19 PROCEDURE — 3074F SYST BP LT 130 MM HG: CPT | Performed by: INTERNAL MEDICINE

## 2025-08-19 PROCEDURE — 99213 OFFICE O/P EST LOW 20 MIN: CPT | Performed by: INTERNAL MEDICINE

## 2025-08-19 PROCEDURE — 3078F DIAST BP <80 MM HG: CPT | Performed by: INTERNAL MEDICINE

## (undated) DIAGNOSIS — S82.891A CLOSED FRACTURE OF RIGHT ANKLE, INITIAL ENCOUNTER: ICD-10-CM

## (undated) DEVICE — SHOULDER STABILIZATION KIT,                                    DISPOSABLE 12 PER BOX

## (undated) DEVICE — CUTTER BN CUT COOLCUT 13CM 5.5

## (undated) DEVICE — GAMMEX® NON-LATEX PI ORTHO SIZE 7.5, STERILE POLYISOPRENE POWDER-FREE SURGICAL GLOVE: Brand: GAMMEX

## (undated) DEVICE — 35 ML SYRINGE REGULAR TIP: Brand: MONOJECT

## (undated) DEVICE — KIT CLEAN ENDOKIT 1.1OZ GOWNX2

## (undated) DEVICE — SUCTION CANISTER, 3000CC,SAFELINER: Brand: DEROYAL

## (undated) DEVICE — SUTURE VICRYL 2-0 FS-1

## (undated) DEVICE — GAMMEX® PI HYBRID SIZE 9, STERILE POWDER-FREE SURGICAL GLOVE, POLYISOPRENE AND NEOPRENE BLEND: Brand: GAMMEX

## (undated) DEVICE — C-ARMOR C-ARM EQUIPMENT COVERS CLEAR STERILE UNIVERSAL FIT 12 PER CASE: Brand: C-ARMOR

## (undated) DEVICE — GAMMEX® PI HYBRID SIZE 7.5, STERILE POWDER-FREE SURGICAL GLOVE, POLYISOPRENE AND NEOPRENE BLEND: Brand: GAMMEX

## (undated) DEVICE — TUBING IRR 16FT CNT WV 3 ASCP

## (undated) DEVICE — SNARE ENDOSCOPIC 10MM ROUND

## (undated) DEVICE — GAMMEX® NON-LATEX PI ORTHO SIZE 9, STERILE POLYISOPRENE POWDER-FREE SURGICAL GLOVE: Brand: GAMMEX

## (undated) DEVICE — MEDI-VAC NON-CONDUCTIVE SUCTION TUBING: Brand: CARDINAL HEALTH

## (undated) DEVICE — 3M™ TEGADERM™ TRANSPARENT FILM DRESSING, 1626W, 4 IN X 4-3/4 IN (10 CM X 12 CM), 50 EACH/CARTON, 4 CARTON/CASE: Brand: 3M™ TEGADERM™

## (undated) DEVICE — Device

## (undated) DEVICE — CLIP LGT 11MM OPEN 2.8MM 235CM

## (undated) DEVICE — PROXIMATE SKIN STAPLERS (35 WIDE) CONTAINS 35 STAINLESS STEEL STAPLES (FIXED HEAD): Brand: PROXIMATE

## (undated) DEVICE — T-MAX DISPOSABLE FACE MASK 8 PER BOX

## (undated) DEVICE — BIT DRL 2MM ANKL CLBRT GRAD

## (undated) DEVICE — SPLINT PRECUT SYNTH 5X30

## (undated) DEVICE — DRAPE SHEET LG

## (undated) DEVICE — SHOULDER ARTHROSCOPY: Brand: MEDLINE INDUSTRIES, INC.

## (undated) DEVICE — TOWEL OR BLU 16X26 STRL

## (undated) DEVICE — PROXIMATE RH ROTATING HEAD SKIN STAPLERS (35 WIDE) CONTAINS 35 STAINLESS STEEL STAPLES: Brand: PROXIMATE

## (undated) DEVICE — KIT ENDO ORCAPOD 160/180/190

## (undated) DEVICE — COUNTERSINK

## (undated) DEVICE — MEDI-VAC NON-CONDUCTIVE SUCTION TUBING 6MM X 1.8M (6FT.) L: Brand: CARDINAL HEALTH

## (undated) DEVICE — SUTURE MONOCRYL 4-0 Y845G

## (undated) DEVICE — CHLORAPREP 26ML APPLICATOR

## (undated) DEVICE — LOWER EXTREMITY: Brand: MEDLINE INDUSTRIES, INC.

## (undated) DEVICE — AMBIENT SUPER TURBOVAC 90 IFS: Brand: COBLATION

## (undated) DEVICE — 3M™ STERI-STRIP™ REINFORCED ADHESIVE SKIN CLOSURES, R1547, 1/2 IN X 4 IN (12 MM X 100 MM), 6 STRIPS/ENVELOPE: Brand: 3M™ STERI-STRIP™

## (undated) DEVICE — CANNULA 5MM SMOOTH AR-6562

## (undated) DEVICE — 20 ML SYRINGE LUER-LOCK TIP: Brand: MONOJECT

## (undated) DEVICE — C-ARM: Brand: UNBRANDED

## (undated) DEVICE — DRILL BIT

## (undated) DEVICE — SOL H2O 1000ML BTL

## (undated) DEVICE — SUTURE PROLENE 3-0 8687H

## (undated) DEVICE — DISPOSABLE TOURNIQUET CUFF SINGLE BLADDER, DUAL PORT AND QUICK CONNECT CONNECTOR: Brand: COLOR CUFF

## (undated) DEVICE — INTENDED FOR TISSUE SEPARATION, AND OTHER PROCEDURES THAT REQUIRE A SHARP SURGICAL BLADE TO PUNCTURE OR CUT.: Brand: BARD-PARKER ® STAINLESS STEEL BLADES

## (undated) DEVICE — ENCORE® LATEX ACCLAIM SIZE 7, STERILE LATEX POWDER-FREE SURGICAL GLOVE: Brand: ENCORE

## (undated) DEVICE — SOL  .9 3000ML

## (undated) DEVICE — LINE MNTR ADLT SET O2 INTMD

## (undated) DEVICE — SNARE OPTMZ PLPCTM TRP

## (undated) DEVICE — GAUZE SPONGES,12 PLY: Brand: CURITY

## (undated) DEVICE — SOL  .9 1000ML BTL

## (undated) NOTE — LETTER
[facility], [place]  Authorization for Invasive Procedures  1. I hereby authorize Dr. Daryl Robison , my physician and whomever may be designated as the doctor's assistant, to perform the following operation and/or procedure:  *** on Pancho Soto at [facility].    2. My physician has explained to me the nature and purpose of the operation or other procedure, possible alternative methods of treatment, the risks involved and the possibility of complications to me. I understand the probable consequences of declining the recommended procedure and the alternative methods of treatment. I acknowledge that no guarantee has been made as to the result that may be obtained. 3. I recognize that during the course of this operation or other procedure, unforeseen conditions may necessitate additional or different procedures than those listed above. I, therefore, further authorize and request that the above-named physician, his/her physician assistants, or designees perform such procedures as are, in his/her professional opinion, necessary and desirable. If I have a Do Not Attempt Resuscitation (DNAR) order in place, that status will be suspended while in the operating room, procedural suite, and during the recovery period unless otherwise explicitly stated by me (or a person authorized to consent on my behalf). The surgeon or my attending physician will determine when the applicable recovery period ends for purposes of reinstating the DNAR order. 4. Should the need arise during my operation or immediate post-operative period; I also consent to the administration of blood and/or blood products.  Further, I understand that despite careful testing and screening of blood and blood products, I may still be subject to ill effects as a result of recieving a blood transfusion an/or blood producst. The following are some, but not all, of the potential risks that can occur: fever and allergic reactions, hemolytic reactions, transmission of disease such as hepatitis, AIDS, cytomegalovirus (CMV), and flluid overload. In the event that I wish to have autologous transfusions of my own blood, or a directed donor transfusion, I will discuss this with my physician. 5. I consent to the photographing of the operations or procedures to be performed for the purposes of advancing medicine, science, and/or education, provided my identity is not revealed. If the procedure has been videotaped, the physician/surgeon will obtain the original videotape. The hospital will not be responsible for storage or maintenance of this tape. 6. I consent to the presence of a  or observer as deemed necessary by my physician or his designee. 7. Any tissues or organs removed in the operation or other procedure may be disposed of by and at the discretion of [facility].    8. I understand that the physician and his/her physician assistants may not be employees or agents of [facility], [facility], [facility], but are independent medical practitioners who have been permitted to use its facilities for the care and treatment of their patients. 9. Patients having a sterilization procedure: I understand that if the procedure is successful the results will be permanent and it will therefore be impossible for me to inseminate, conceive or bear children. I also understand that the procedure is intended to result in sterility, although the result has not been guaranteed. 10. I CERTIFY THAT I HAVE READ AND FULLY UNDERSTAND THE ABOVE CONSENT TO OPERATION and/or OTHER PROCEDURE. 11. I acknowledge that my physician has explained sedation/analgesia administration to me including the risks and benefits. I consent to the administration of sedation/analgesia as may be necessary or desirable in the judgment of my physician.      Signature of Patient: ________________________________________________ Date: _________Time: _________    Responsible person in case of minor or unconscious: _____________________________Relationship: ____________     Witness Signature: ____________________________________________ Date: __________ Time: ___________    Statement of Physician  My signature below affirms that prior to the time of the procedure, I have explained to the patient and/or his legal representative, the risks and benefits involved in the proposed treatment and any reasonable alternative to the proposed treatment. I have also explained the risks and benefits involved in the refusal of the proposed treatment and have answered the patient's questions. If I have a significant financial interest in this procedure/surgery, I have disclosed this and had a discussion with my patient.     Signature of Physician:   ________________________________________Date: _________Time:_______ Patient Name: Martita Whyte  : 1975   Printed: 2022    Medical Record #: [id number]

## (undated) NOTE — LETTER
AUTHORIZATION FOR SURGICAL OPERATION OR OTHER PROCEDURE    1. I hereby authorize Dr. Stewart, and [facility] staff assigned to my case to perform the following operation and/or procedure at the [facility] site:    _______________________________________________________________________________________________    Right ankle Cortisone Injection   _______________________________________________________________________________________________    2.  My physician has explained the nature and purpose of the operation or other procedure, possible alternative methods of treatment, the risks involved, and the possibility of complication to me.  I acknowledge that no guarantee has been made as to the result that may be obtained.  3.  I recognize that, during the course of this operation, or other procedure, unforseen conditions may necessitate additional or different procedure than those listed above.  I, therefore, further authorize and request that the above named physician, his/her physician assistants or designees perform such procedures as are, in his/her professional opinion, necessary and desirable.  4.  Any tissue or organs removed in the operation or other procedure may be disposed of by and at the discretion of the [facility] and [facility].  5.  I understand that in the event of a medical emergency, I will be transported by local paramedics to [facility] or other hospital emergency department.  6.  I certify that I have read and fully understand the above consent to operation and/or other procedure.    7.  I acknowledge that my physician has explained sedation/analgesia administration to me including the risks and benefits.  I consent to the administration of sedation/analgesia as may be necessary or desirable in the judgement of my physician.    Witness signature: ___________________________________________________ Date:  ______/______/_____                     Time:  ________ A.M.  P.M.       Patient Name:  ______________________________________________________  (please print)      Patient signature:  ___________________________________________________             Relationship to Patient:           []  Parent    Responsible person                          []  Spouse  In case of minor or                    [] Other  _____________   Incompetent name:  __________________________________________________                               (please print)      _____________      Responsible person  In case of minor or  Incompetent signature:  _______________________________________________    Statement of Physician  My signature below affirms that prior to the time of the procedure, I have explained to the patient and/or his/her guardian, the risks and benefits involved in the proposed treatment and any reasonable alternative to the proposed treatment.  I have also explained the risks and benefits involved in the refusal of the proposed treatment and have answered the patient's questions.                        Date:  ______/______/_______  Provider                      Signature:  __________________________________________________________       Time:  ___________ A.M    P.M.

## (undated) NOTE — ED AVS SNAPSHOT
Justine Mar   MRN: [id number]    Department:  [facility] Emergency Department   Date of Visit:  1/14/2019           Disclosure     Insurance plans vary and the physician(s) referred by the ER may not be covered by your plan.  Please contact y CARE PHYSICIAN AT ONCE OR RETURN IMMEDIATELY TO THE EMERGENCY DEPARTMENT. If you have been prescribed any medication(s), please fill your prescription right away and begin taking the medication(s) as directed.   If you believe that any of the medications

## (undated) NOTE — LETTER
8/15/2017              [address] [place] [address]         To whom it may concern,      This is to certify that Mr Antonino Bowden is our patient and under my care since July 31, 2017 for different medical conditio

## (undated) NOTE — LETTER
[address], [place]  Authorization for Invasive Procedures  1. I hereby authorize Dr. Ronald Arriaza , my physician and whomever may be designated as the doctor's assistant, to perform the following operation and/or procedure:  Colonoscopy on Bhasakr Model at [facility].    2. My physician has explained to me the nature and purpose of the operation or other procedure, possible alternative methods of treatment, the risks involved and the possibility of complications to me. I understand the probable consequences of declining the recommended procedure and the alternative methods of treatment. I acknowledge that no guarantee has been made as to the result that may be obtained. 3. I recognize that during the course of this operation or other procedure, unforeseen conditions may necessitate additional or different procedures than those listed above. I, therefore, further authorize and request that the above-named physician, his/her physician assistants, or designees perform such procedures as are, in his/her professional opinion, necessary and desirable. If I have a Do Not Attempt Resuscitation (DNAR) order in place, that status will be suspended while in the operating room, procedural suite, and during the recovery period unless otherwise explicitly stated by me (or a person authorized to consent on my behalf). The surgeon or my attending physician will determine when the applicable recovery period ends for purposes of reinstating the DNAR order. 4. Should the need arise during my operation or immediate post-operative period; I also consent to the administration of blood and/or blood products.  Further, I understand that despite careful testing and screening of blood and blood products, I may still be subject to ill effects as a result of recieving a blood transfusion an/or blood producst. The following are some, but not all, of the potential risks that can occur: fever and allergic reactions, hemolytic reactions, transmission of disease such as hepatitis, AIDS, cytomegalovirus (CMV), and flluid overload. In the event that I wish to have autologous transfusions of my own blood, or a directed donor transfusion, I will discuss this with my physician. 5. I consent to the photographing of the operations or procedures to be performed for the purposes of advancing medicine, science, and/or education, provided my identity is not revealed. If the procedure has been videotaped, the physician/surgeon will obtain the original videotape. The hospital will not be responsible for storage or maintenance of this tape. 6. I consent to the presence of a  or observer as deemed necessary by my physician or his designee. 7. Any tissues or organs removed in the operation or other procedure may be disposed of by and at the discretion of [facility].    8. I understand that the physician and his/her physician assistants may not be employees or agents of [facility], [facility], [facility], but are independent medical practitioners who have been permitted to use its facilities for the care and treatment of their patients. 9. Patients having a sterilization procedure: I understand that if the procedure is successful the results will be permanent and it will therefore be impossible for me to inseminate, conceive or bear children. I also understand that the procedure is intended to result in sterility, although the result has not been guaranteed. 10. I CERTIFY THAT I HAVE READ AND FULLY UNDERSTAND THE ABOVE CONSENT TO OPERATION and/or OTHER PROCEDURE. 11. I acknowledge that my physician has explained sedation/analgesia administration to me including the risks and benefits. I consent to the administration of sedation/analgesia as may be necessary or desirable in the judgment of my physician.      Signature of Patient:  ________________________________________________ Date: _________Time: _________    Responsible person in case of minor or unconscious: _____________________________Relationship: ____________     Witness Signature: ____________________________________________ Date: __________ Time: ___________    Statement of Physician  My signature below affirms that prior to the time of the procedure, I have explained to the patient and/or his legal representative, the risks and benefits involved in the proposed treatment and any reasonable alternative to the proposed treatment. I have also explained the risks and benefits involved in the refusal of the proposed treatment and have answered the patient's questions. If I have a significant financial interest in this procedure/surgery, I have disclosed this and had a discussion with my patient.     Signature of Physician:   ________________________________________Date: _________Time:_______ Patient Name: Kiko Barrow  : 1975   Printed: 2022    Medical Record #: [id number]

## (undated) NOTE — LETTER
11/14/2017          To Whom It May Concern:    Albert Patricia is currently under my medical care PHe may return to work on 11/15/2017. Activity is restricted as follows: none. If you require additional information please contact our office.         [facility]

## (undated) NOTE — MR AVS SNAPSHOT
[address], [place]  [address]  [id number] [phone]               Thank you for choosing us for your health care visit with Radha Mcnair MD.  We are glad to serve you and happy to provide you with this summary of yo Family history of colon cancer          Instructions and Information about Your Health     None      Allergies as of Apr 21, 2017     No Known Allergies                Today's Vital Signs     BP Pulse Temp Weight          114/72 mmHg 57 98.1 °F (36.7 °C) Water is best for hydration Fast food. Eat at home when possible     Tips for increasing your physical activity – Adults who are physically active are less likely to develop some chronic diseases than adults who are inactive.      HOW TO GET STARTED: HOW

## (undated) NOTE — LETTER
Date & Time: 1/14/2019, 3:47 PM  Patient: Yandy Ackerman  Encounter Provider(s):    Kade Aquino MD       To Whom It May Concern:    Talib Padron was seen and treated in our department on 1/14/2019. He may return to work on 1/15/2019. If you have any q

## (undated) NOTE — LETTER
11/20/2017          To Whom It May Concern:    Albert Patricia is currently under my medical care and may not return to work at this time. He may return to work on 11/28/2017. Activity is restricted as follows: none.     If you require additional informa

## (undated) NOTE — LETTER
[facility]  [address] [address]      2/18/2022    Dear Bronson Orta,    It has come to our attention that a required CT CHEST test is due 4/9/22. This test was ordered by Dr. Gamal Walker. This test requires a prior authorization. The [facility] at [phone] will obtain the prior authorization and contact you when it has been approved. You can schedule the test once you receive approval notification. If you have any questions please contact our office at [phone].        Thank you,        [facility] Staff

## (undated) NOTE — LETTER
5/24/2019              [facility]         Dear Kaylen Ruiz,    [facility] records indicate that the tests ordered for you by Anil Turcios MD  have not been done.   If you have, in fact, already completed

## (undated) NOTE — LETTER
[facility]  [address] [place] [address]      3/21/2023    Dear Nasim Ty,    It has come to our attention that you are due for: Ct Chest  in April    This test was ordered by Dr. Maria Guadalupe Tejada. If you are allergic to iodine or have any questions, please call the office at [phone].        Thank you,         [facility]

## (undated) NOTE — LETTER
11/14/2017          To Whom It May Concern:    Gualberto Farias is currently under my medical care and may return to work on 11/20/17. Please excuse Estle Minus. Activity is restricted as follows: none.     If you require additional information please contact o